# Patient Record
Sex: FEMALE | Race: WHITE | NOT HISPANIC OR LATINO | Employment: UNEMPLOYED | URBAN - METROPOLITAN AREA
[De-identification: names, ages, dates, MRNs, and addresses within clinical notes are randomized per-mention and may not be internally consistent; named-entity substitution may affect disease eponyms.]

---

## 2021-03-03 ENCOUNTER — HOSPITAL ENCOUNTER (EMERGENCY)
Facility: CLINIC | Age: 41
Discharge: HOME | End: 2021-03-03
Attending: EMERGENCY MEDICINE | Admitting: EMERGENCY MEDICINE
Payer: COMMERCIAL

## 2021-03-03 VITALS
DIASTOLIC BLOOD PRESSURE: 98 MMHG | OXYGEN SATURATION: 97 % | HEIGHT: 62 IN | WEIGHT: 150 LBS | SYSTOLIC BLOOD PRESSURE: 157 MMHG | TEMPERATURE: 98.2 F | HEART RATE: 99 BPM | RESPIRATION RATE: 18 BRPM | BODY MASS INDEX: 27.6 KG/M2

## 2021-03-03 DIAGNOSIS — J02.9 PHARYNGITIS, UNSPECIFIED ETIOLOGY: ICD-10-CM

## 2021-03-03 LAB
DEPRECATED S PYO AG THROAT QL EIA: NEGATIVE
FLUAV RNA RESP QL NAA+PROBE: NEGATIVE
FLUBV RNA RESP QL NAA+PROBE: NEGATIVE
LABORATORY COMMENT REPORT: NORMAL
RSV RNA SPEC QL NAA+PROBE: NORMAL
SARS-COV-2 RNA RESP QL NAA+PROBE: NEGATIVE
SPECIMEN SOURCE: NORMAL
STREP GROUP A PCR: NOT DETECTED

## 2021-03-03 PROCEDURE — 250N000011 HC RX IP 250 OP 636: Performed by: EMERGENCY MEDICINE

## 2021-03-03 PROCEDURE — 87651 STREP A DNA AMP PROBE: CPT | Performed by: EMERGENCY MEDICINE

## 2021-03-03 PROCEDURE — 999N001174 HC STATISTIC STREP A RAPID: Performed by: EMERGENCY MEDICINE

## 2021-03-03 PROCEDURE — C9803 HOPD COVID-19 SPEC COLLECT: HCPCS

## 2021-03-03 PROCEDURE — 87636 SARSCOV2 & INF A&B AMP PRB: CPT | Performed by: EMERGENCY MEDICINE

## 2021-03-03 PROCEDURE — 99283 EMERGENCY DEPT VISIT LOW MDM: CPT

## 2021-03-03 RX ORDER — HYDROCODONE BITARTRATE AND ACETAMINOPHEN 7.5; 325 MG/15ML; MG/15ML
15 SOLUTION ORAL 3 TIMES DAILY PRN
Qty: 90 ML | Refills: 0 | Status: SHIPPED | OUTPATIENT
Start: 2021-03-03 | End: 2021-03-05

## 2021-03-03 RX ORDER — DEXAMETHASONE SODIUM PHOSPHATE 10 MG/ML
10 INJECTION, SOLUTION INTRAMUSCULAR; INTRAVENOUS ONCE
Status: COMPLETED | OUTPATIENT
Start: 2021-03-03 | End: 2021-03-03

## 2021-03-03 RX ADMIN — DEXAMETHASONE SODIUM PHOSPHATE 10 MG: 10 INJECTION, SOLUTION INTRAMUSCULAR; INTRAVENOUS at 08:43

## 2021-03-03 ASSESSMENT — ENCOUNTER SYMPTOMS
RHINORRHEA: 0
SHORTNESS OF BREATH: 0
TROUBLE SWALLOWING: 0
FEVER: 0
SORE THROAT: 1
COUGH: 0

## 2021-03-03 ASSESSMENT — MIFFLIN-ST. JEOR: SCORE: 1308.65

## 2021-03-03 NOTE — RESULT ENCOUNTER NOTE
Group A Streptococcus PCR is NEGATIVE  No treatment or change in treatment [facility] ED lab result protocol - Strep protocol.

## 2021-03-03 NOTE — ED PROVIDER NOTES
"  History   Chief Complaint:  Pharyngitis     HPI   Suzette Tadeo is a 40 year old female with a history of tonsillectomy and adenoidectomy  who presents for evaluation of sore throat and erythema that started yesterday. She is able to swallow but feels like there is something stuck. She denies shortness of breath, fever, runny nose, congestion or cough. 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      Review of Systems   Constitutional: Negative for fever.   HENT: Positive for sore throat. Negative for congestion, rhinorrhea and trouble swallowing.    Respiratory: Negative for cough and shortness of breath.    All other systems reviewed and are negative.    Allergies:  The patient has no known allergies.     Medications:  Wellbutrin  Adderall   Salsalate    Past Medical History:    The patient denies past medical history.      Past Surgical History:    Tonsillectomy and adenoidectomy    Mammoplasty, bilateral reduction     Social History:  The patient arrived to the ED Alone via private car.  PCP: Chuck Toth  Tobacco Use: Current Every Day Smoker     Alcohol Use: Yes  Drug Use: No    Physical Exam     Patient Vitals for the past 24 hrs:   BP Temp Temp src Pulse Resp SpO2 Height Weight   03/03/21 1023 -- -- -- -- -- 97 % -- --   03/03/21 0850 -- -- -- 99 -- 97 % -- --   03/03/21 0848 -- -- -- -- -- 98 % -- --   03/03/21 0844 -- -- -- -- 18 -- -- --   03/03/21 0732 (!) 157/98 98.2  F (36.8  C) Oral 111 -- 100 % 1.575 m (5' 2\") 68 kg (150 lb)       Physical Exam   Nursing note and vitals reviewed.  General: Oriented to person, place, and time. Appears well-developed and well-nourished.   Head: No signs of trauma.   Mouth/Throat: Oropharynx is moist. Erythematous throat, previous tonsillectomy and adenoidectomy.   Eyes: Conjunctivae are normal. Pupils are equal, round, and reactive to light.   Neck: Normal range of motion. No nuchal rigidity.   Cardiovascular: " Normal rate and regular rhythm.    Respiratory: Effort normal and breath sounds normal. No respiratory distress.   Abdominal: Soft. There is no tenderness. There is no guarding.   Musculoskeletal: Normal range of motion. no edema.   Neurological: The patient is alert and oriented to person, place, and time.  PERRLA, EOMI, visual fields intact, strength in upper/lower extremities normal and symmetrical.   Sensation normal. Speech normal  GCS eye subscore is 4. GCS verbal subscore is 5. GCS motor subscore is 6.   Skin: Skin is warm and dry. No rash noted.   Psychiatric: normal mood and affect. behavior is normal.     Emergency Department Course     Laboratory:  Symptomatic Influenza A/B antigen & COVID-19 PCR: negative     Rapid Strep Test: negative    Strep Culture: Pending    Emergency Department Course:    Reviewed:  I reviewed the patient's nursing notes, vitals and past medical history.     Assessments:  0825 I performed an exam of the patient in room ED03 as documented above.    Interventions:  0843 Decadron, 10 mg, oral     Disposition:  The patient was discharged to home.     Impression & Plan     CMS Diagnoses: None    Medical Decision Making:  Suzette Tadeo is a 40 year old female who presents for evaluation of a sore throat and clinical evidence of pharyngitis.  The rapid strep test is negative, and formal culture has been set up in the lab. There is no clinical evidence of peritonsillar abscess, retropharyngeal abscess, Lemierre's Syndrome, epiglottis, or Alan's angina. The etiology is most likely viral.  I have recommended treatment with analgesics, and we will await formal culture results.  If the culture is positive, an ED physician will call the patient to initiate anti-microbial therapy. Return if increasing pain, change in voice, neck pain, vomiting, fever, or shortness of breath. Follow-up with primary physician if not improving in 3-5 days. Given well appearance, I would not test further for  other etiologies of serious bacterial infections.     Covid-19  Suzette Tadeo was evaluated during a global COVID-19 pandemic, which necessitated consideration that the patient might be at risk for infection with the SARS-CoV-2 virus that causes COVID-19.   Applicable protocols for evaluation were followed during the patient's care.   COVID-19 was considered as part of the patient's evaluation. The plan for testing is:  a NEGATIVE test was obtained during this visit.    Diagnosis:    ICD-10-CM    1. Pharyngitis, unspecified etiology  J02.9        Discharge Medication List as of 3/3/2021 10:22 AM      START taking these medications    Details   HYDROcodone-acetaminophen (LORTAB)  MG/15ML solution Take 5.6 mLs by mouth 4 times daily for 2 days, Disp-50 mL, R-0, Local Print      HYDROcodone-acetaminophen 7.5-325 MG/15ML solution Take 15 mLs by mouth 3 times daily as needed for severe pain, Disp-90 mL, R-0, Local Print             Scribe Disclosure:  I, Xiomy Coronado, am serving as a scribe at 7:52 AM on 3/3/2021 to document services personally performed by Villa Mays MD based on my observations and the provider's statements to me.     This note was completed in part using Dragon voice recognition software. Although reviewed after completion, some word and grammatical errors may occur.      Villa Mays MD  03/03/21 6430

## 2021-03-03 NOTE — ED TRIAGE NOTES
started yesterday, soreness, able to swallow, denies any dypsnea, fever, cough, shortness of breath

## 2021-05-16 ENCOUNTER — HOSPITAL ENCOUNTER (EMERGENCY)
Facility: CLINIC | Age: 41
Discharge: HOME | End: 2021-05-16
Attending: EMERGENCY MEDICINE | Admitting: EMERGENCY MEDICINE

## 2021-05-16 VITALS
SYSTOLIC BLOOD PRESSURE: 150 MMHG | TEMPERATURE: 97.5 F | RESPIRATION RATE: 16 BRPM | OXYGEN SATURATION: 99 % | HEIGHT: 62 IN | BODY MASS INDEX: 27.6 KG/M2 | HEART RATE: 110 BPM | WEIGHT: 150 LBS | DIASTOLIC BLOOD PRESSURE: 96 MMHG

## 2021-05-16 DIAGNOSIS — S01.81XA FACIAL LACERATION, INITIAL ENCOUNTER: ICD-10-CM

## 2021-05-16 PROCEDURE — 250N000009 HC RX 250: Performed by: EMERGENCY MEDICINE

## 2021-05-16 PROCEDURE — 271N000002 HC RX 271: Performed by: EMERGENCY MEDICINE

## 2021-05-16 PROCEDURE — 12011 RPR F/E/E/N/L/M 2.5 CM/<: CPT

## 2021-05-16 PROCEDURE — 99283 EMERGENCY DEPT VISIT LOW MDM: CPT

## 2021-05-16 RX ORDER — METHYLCELLULOSE 4000CPS 30 %
POWDER (GRAM) MISCELLANEOUS ONCE
Status: COMPLETED | OUTPATIENT
Start: 2021-05-16 | End: 2021-05-16

## 2021-05-16 RX ADMIN — Medication: at 18:20

## 2021-05-16 ASSESSMENT — ENCOUNTER SYMPTOMS
HEADACHES: 0
WOUND: 1
SEIZURES: 0

## 2021-05-16 ASSESSMENT — MIFFLIN-ST. JEOR: SCORE: 1308.65

## 2021-05-16 NOTE — ED PROVIDER NOTES
"  History   Chief Complaint:  Head Laceration    HPI   Suzette Tadeo is a 40 year old female who is not anticoagulated who presents with a head laceration. The patient states she was breaking down a wood chair and the wood hit her on her left upper eyebrow. She denies loss of consciousness, seizure, or any other injury. She states that her tetanus is up to date.     Review of Systems   Skin: Positive for wound.   Neurological: Negative for seizures and headaches.   All other systems reviewed and are negative.    Allergies:  The patient has no known allergies.     Medications:  Senokot-S    Past Medical History:    ADHD  Anxiety  Asthma  Chemical dependency     Past Surgical History:    Tonsillectomy  Mammoplasty reduction, bilateral     Family History:    Mother - Heart Valve Replacement, Hypertension    Social History:  The patient presents to the emergency department with a friend.    Physical Exam     Patient Vitals for the past 24 hrs:   BP Temp Temp src Pulse Resp SpO2 Height Weight   05/16/21 1749 (!) 150/96 97.5  F (36.4  C) Temporal 110 16 99 % 1.575 m (5' 2\") 68 kg (150 lb)       Physical Exam  Physical Exam   General:  Sitting on bed with friend at bedside, comfortable appearing.   HENT:  No obvious trauma to head  Right Ear:  External ear normal.   Left Ear:  External ear normal.   Nose:  Nose normal.   Eyes:  Conjunctivae and EOM are normal.  Neck: No midline cervical neck tenderness, deformity, step off or pain in the midline with ROM, other than 1.5 cm laceration above the left eyebrow with no step off.  Pulm/Chest: No respiratory distress  M/S: Normal range of motion.   Neuro: Alert. GCS 15.  Skin: Skin is warm and dry. No rash noted. Not diaphoretic.   Psych: Normal mood and affect. Behavior is normal.     Emergency Department Course     Procedures    Laceration Repair        LACERATION:  A simple minimally Contaminated 1.5 cm laceration.      LOCATION:  Left eyebrow      FUNCTION:  Distally " sensation and circulation are intact.      ANESTHESIA:  LET - Topical      PREPARATION:  Irrigation and Scrubbing with Techni-Care and Normal Saline      DEBRIDEMENT:  no debridement      CLOSURE:  Wound was closed with One Layer.  Skin closed with 3 x 6.0 Prolene using interrupted sutures.    Emergency Department Course:    Reviewed:  I reviewed vitals, past medical history and care everywhere    Assessments:  1816 I obtained history and examined the patient as noted above.   1915 I performed a laceration repair, procedure note above.   I rechecked the patient and explained findings.    Interventions:  1820 LET topical  1820 Methylcellulose powder topical    Disposition:  The patient was discharged to home.     Impression & Plan   Medical Decision Making:  Suzette Tadeo is a very pleasant 40 year old year old patient who presents to the emergency department for evaluation of a laceration to the forehead. By the Evangeline head CT rules the patient does not warrant head CT evaluation and I believe she is at very low risk for skull fracture or intracerebral bleeding. Concussion is likewise of very low probability with no loss of consciousness and normal mental status here.  Reviewed the risk and benefit of management with her and she agreed against.  Cervical spine is cleared clinically.  The patient had no injury elsewhere and further trauma workup is not necessary. The wound was carefully evaluated and explored. The laceration was closed with sutures as noted above. There is no evidence of muscular, tendon, or bony damage with this laceration. No signs of foreign body. Possible complications (infection, scarring) were reviewed with the patient.  The patient formed us that her tetanus is up-to-date within the past 10 years.  Follow up with primary care will be indicated as noted in the discharge section.    The treatment plan was discussed with the patient and they expressed understanding of this plan and consented  to the plan.  In addition, the patient will return to the emergency department if their symptoms persist, worsen, if new symptoms arise or if there is any concern as other pathology may be present that is not evident at this time. They also understand the importance of close follow up in the clinic and if unable to do so will return to the emergency department for a reevaluation. All questions were answered.    Diagnosis:    ICD-10-CM    1. Facial laceration, initial encounter  S01.81XA      Scribe Disclosure:  I, Karma Nelson, am serving as a scribe at 6:14 PM on 5/16/2021 to document services personally performed by Martin Bailey DO based on my observations and the provider's statements to me.     Martin Bailey DO  05/16/21 1936

## 2023-01-06 ENCOUNTER — HOSPITAL ENCOUNTER (EMERGENCY)
Facility: CLINIC | Age: 43
Discharge: LEFT BEFORE DISCHARGE | End: 2023-01-06
Admitting: EMERGENCY MEDICINE

## 2023-01-06 VITALS
HEART RATE: 99 BPM | TEMPERATURE: 97.7 F | OXYGEN SATURATION: 98 % | DIASTOLIC BLOOD PRESSURE: 106 MMHG | RESPIRATION RATE: 16 BRPM | SYSTOLIC BLOOD PRESSURE: 173 MMHG

## 2023-01-06 LAB
ANION GAP SERPL CALCULATED.3IONS-SCNC: 9 MMOL/L (ref 7–15)
BASOPHILS # BLD AUTO: 0.1 10E3/UL (ref 0–0.2)
BASOPHILS NFR BLD AUTO: 1 %
BUN SERPL-MCNC: 10.2 MG/DL (ref 6–20)
CALCIUM SERPL-MCNC: 9 MG/DL (ref 8.6–10)
CHLORIDE SERPL-SCNC: 103 MMOL/L (ref 98–107)
CREAT SERPL-MCNC: 0.61 MG/DL (ref 0.51–0.95)
DEPRECATED HCO3 PLAS-SCNC: 27 MMOL/L (ref 22–29)
EOSINOPHIL # BLD AUTO: 0.1 10E3/UL (ref 0–0.7)
EOSINOPHIL NFR BLD AUTO: 1 %
ERYTHROCYTE [DISTWIDTH] IN BLOOD BY AUTOMATED COUNT: 12.1 % (ref 10–15)
GFR SERPL CREATININE-BSD FRML MDRD: >90 ML/MIN/1.73M2
GLUCOSE SERPL-MCNC: 110 MG/DL (ref 70–99)
HCG SERPL QL: NEGATIVE
HCT VFR BLD AUTO: 44.1 % (ref 35–47)
HGB BLD-MCNC: 14.3 G/DL (ref 11.7–15.7)
IMM GRANULOCYTES # BLD: 0.1 10E3/UL
IMM GRANULOCYTES NFR BLD: 1 %
LACTATE SERPL-SCNC: 1.4 MMOL/L (ref 0.7–2)
LYMPHOCYTES # BLD AUTO: 2.1 10E3/UL (ref 0.8–5.3)
LYMPHOCYTES NFR BLD AUTO: 19 %
MCH RBC QN AUTO: 30.7 PG (ref 26.5–33)
MCHC RBC AUTO-ENTMCNC: 32.4 G/DL (ref 31.5–36.5)
MCV RBC AUTO: 95 FL (ref 78–100)
MONOCYTES # BLD AUTO: 0.7 10E3/UL (ref 0–1.3)
MONOCYTES NFR BLD AUTO: 6 %
NEUTROPHILS # BLD AUTO: 8.2 10E3/UL (ref 1.6–8.3)
NEUTROPHILS NFR BLD AUTO: 72 %
NRBC # BLD AUTO: 0 10E3/UL
NRBC BLD AUTO-RTO: 0 /100
PLATELET # BLD AUTO: 353 10E3/UL (ref 150–450)
POTASSIUM SERPL-SCNC: 3.7 MMOL/L (ref 3.4–5.3)
RBC # BLD AUTO: 4.66 10E6/UL (ref 3.8–5.2)
SODIUM SERPL-SCNC: 139 MMOL/L (ref 136–145)
WBC # BLD AUTO: 11.2 10E3/UL (ref 4–11)

## 2023-01-06 PROCEDURE — 83605 ASSAY OF LACTIC ACID: CPT | Performed by: EMERGENCY MEDICINE

## 2023-01-06 PROCEDURE — 85025 COMPLETE CBC W/AUTO DIFF WBC: CPT | Performed by: EMERGENCY MEDICINE

## 2023-01-06 PROCEDURE — 36415 COLL VENOUS BLD VENIPUNCTURE: CPT | Performed by: EMERGENCY MEDICINE

## 2023-01-06 PROCEDURE — 250N000011 HC RX IP 250 OP 636: Performed by: EMERGENCY MEDICINE

## 2023-01-06 PROCEDURE — 96365 THER/PROPH/DIAG IV INF INIT: CPT

## 2023-01-06 PROCEDURE — 84703 CHORIONIC GONADOTROPIN ASSAY: CPT | Performed by: EMERGENCY MEDICINE

## 2023-01-06 PROCEDURE — 80048 BASIC METABOLIC PNL TOTAL CA: CPT | Performed by: EMERGENCY MEDICINE

## 2023-01-06 PROCEDURE — 99281 EMR DPT VST MAYX REQ PHY/QHP: CPT | Mod: 25

## 2023-01-06 PROCEDURE — 87040 BLOOD CULTURE FOR BACTERIA: CPT | Performed by: EMERGENCY MEDICINE

## 2023-01-06 RX ORDER — CEFAZOLIN SODIUM 2 G/100ML
2 INJECTION, SOLUTION INTRAVENOUS ONCE
Status: COMPLETED | OUTPATIENT
Start: 2023-01-06 | End: 2023-01-06

## 2023-01-06 RX ADMIN — CEFAZOLIN SODIUM 2 G: 2 INJECTION, SOLUTION INTRAVENOUS at 18:26

## 2023-01-06 ASSESSMENT — ACTIVITIES OF DAILY LIVING (ADL)
ADLS_ACUITY_SCORE: 37
ADLS_ACUITY_SCORE: 37

## 2023-01-06 NOTE — ED TRIAGE NOTES
Pt was seen at [facility] 1/2/23 for abscess.  Pt was transferred to [place] 1/3 for surgery and further eval.  Pt arrived at ED an left ama due to .  She is currently not on antibiotics.  She returned due to pain and swelling.     Triage Assessment     Row Name 01/06/23 1459       Triage Assessment (Adult)    Airway WDL WDL       Respiratory WDL    Respiratory WDL WDL

## 2023-01-06 NOTE — ED NOTES
PIT/Triage Evaluation    Patient presented with continued swelling, pain, redness and discharge over right upper thigh region where she had I&D for abscess performed earlier this week at [facility], transferred to [place] for ongoing IV antibiotics due to extensive wound area but left AMA after 24 hours of therapy due to . She was not discharged on oral antibiotics. She denies fever or chills. She has been able to walk on the affected extremity.     Exam is notable for adult female sitting upright. There is tender, firm soft tissue swelling and erythema over the right lateral upper thigh with central wound with packing, draining small amount of purulence. No crepitus.     Appropriate interventions for symptom management were initiated if applicable.  Appropriate diagnostic tests were initiated if indicated.    I briefly evaluated the patient and developed an initial plan of care. I discussed this plan and explained that this brief interaction does not constitute a full evaluation. Patient/family understands that they should wait to be fully evaluated and discuss any test results with another clinician prior to leaving the hospital.    Due to hospital and departmental capacity constraints and prolonged wait times, this patient was evaluated in non-traditional circumstances such as in triage/waiting room, a hallway, etc. I explained the option to wait for a traditional treatment space and apologized for the inconvenience. Given the circumstances, every attempt was made to provide for the patient's comfort and privacy and to perform the most thorough evaluation possible.       Patricia Wheat MD  01/06/23 4595

## 2023-01-11 LAB
BACTERIA BLD CULT: NO GROWTH
BACTERIA BLD CULT: NO GROWTH

## 2023-03-12 ENCOUNTER — APPOINTMENT (OUTPATIENT)
Dept: CT IMAGING | Facility: CLINIC | Age: 43
End: 2023-03-12
Attending: EMERGENCY MEDICINE

## 2023-03-12 ENCOUNTER — HOSPITAL ENCOUNTER (EMERGENCY)
Facility: CLINIC | Age: 43
Discharge: ANOTHER HOSPITAL | End: 2023-03-13
Attending: EMERGENCY MEDICINE | Admitting: EMERGENCY MEDICINE

## 2023-03-12 DIAGNOSIS — A41.9 SEPSIS, DUE TO UNSPECIFIED ORGANISM, UNSPECIFIED WHETHER ACUTE ORGAN DYSFUNCTION PRESENT (H): ICD-10-CM

## 2023-03-12 DIAGNOSIS — L03.211 FACIAL CELLULITIS: ICD-10-CM

## 2023-03-12 DIAGNOSIS — K02.9 DENTAL DECAY: ICD-10-CM

## 2023-03-12 DIAGNOSIS — F15.10 METHAMPHETAMINE ABUSE (H): ICD-10-CM

## 2023-03-12 DIAGNOSIS — L02.01 FACIAL ABSCESS: ICD-10-CM

## 2023-03-12 LAB
ANION GAP SERPL CALCULATED.3IONS-SCNC: 7 MMOL/L (ref 7–15)
BASOPHILS # BLD AUTO: 0.1 10E3/UL (ref 0–0.2)
BASOPHILS NFR BLD AUTO: 0 %
BUN SERPL-MCNC: 5.6 MG/DL (ref 6–20)
CALCIUM SERPL-MCNC: 8.7 MG/DL (ref 8.6–10)
CHLORIDE SERPL-SCNC: 105 MMOL/L (ref 98–107)
CREAT SERPL-MCNC: 0.55 MG/DL (ref 0.51–0.95)
DEPRECATED HCO3 PLAS-SCNC: 24 MMOL/L (ref 22–29)
EOSINOPHIL # BLD AUTO: 0.1 10E3/UL (ref 0–0.7)
EOSINOPHIL NFR BLD AUTO: 1 %
ERYTHROCYTE [DISTWIDTH] IN BLOOD BY AUTOMATED COUNT: 12.6 % (ref 10–15)
GFR SERPL CREATININE-BSD FRML MDRD: >90 ML/MIN/1.73M2
GLUCOSE BLDC GLUCOMTR-MCNC: 94 MG/DL (ref 70–99)
GLUCOSE SERPL-MCNC: 95 MG/DL (ref 70–99)
HCG SER QL IA.RAPID: NEGATIVE
HCT VFR BLD AUTO: 42.2 % (ref 35–47)
HGB BLD-MCNC: 13.8 G/DL (ref 11.7–15.7)
HOLD SPECIMEN: NORMAL
HOLD SPECIMEN: NORMAL
IMM GRANULOCYTES # BLD: 0.1 10E3/UL
IMM GRANULOCYTES NFR BLD: 1 %
LYMPHOCYTES # BLD AUTO: 1.5 10E3/UL (ref 0.8–5.3)
LYMPHOCYTES NFR BLD AUTO: 11 %
MCH RBC QN AUTO: 30 PG (ref 26.5–33)
MCHC RBC AUTO-ENTMCNC: 32.7 G/DL (ref 31.5–36.5)
MCV RBC AUTO: 92 FL (ref 78–100)
MONOCYTES # BLD AUTO: 1 10E3/UL (ref 0–1.3)
MONOCYTES NFR BLD AUTO: 8 %
NEUTROPHILS # BLD AUTO: 10.6 10E3/UL (ref 1.6–8.3)
NEUTROPHILS NFR BLD AUTO: 79 %
NRBC # BLD AUTO: 0 10E3/UL
NRBC BLD AUTO-RTO: 0 /100
PLATELET # BLD AUTO: 248 10E3/UL (ref 150–450)
POTASSIUM SERPL-SCNC: 3.7 MMOL/L (ref 3.4–5.3)
RBC # BLD AUTO: 4.6 10E6/UL (ref 3.8–5.2)
SODIUM SERPL-SCNC: 136 MMOL/L (ref 136–145)
WBC # BLD AUTO: 13.3 10E3/UL (ref 4–11)

## 2023-03-12 PROCEDURE — 96361 HYDRATE IV INFUSION ADD-ON: CPT

## 2023-03-12 PROCEDURE — 99285 EMERGENCY DEPT VISIT HI MDM: CPT | Mod: 25

## 2023-03-12 PROCEDURE — 36415 COLL VENOUS BLD VENIPUNCTURE: CPT | Performed by: EMERGENCY MEDICINE

## 2023-03-12 PROCEDURE — 70487 CT MAXILLOFACIAL W/DYE: CPT

## 2023-03-12 PROCEDURE — 96365 THER/PROPH/DIAG IV INF INIT: CPT | Mod: 59

## 2023-03-12 PROCEDURE — 258N000003 HC RX IP 258 OP 636: Performed by: EMERGENCY MEDICINE

## 2023-03-12 PROCEDURE — 250N000011 HC RX IP 250 OP 636: Performed by: EMERGENCY MEDICINE

## 2023-03-12 PROCEDURE — 82962 GLUCOSE BLOOD TEST: CPT

## 2023-03-12 PROCEDURE — 80048 BASIC METABOLIC PNL TOTAL CA: CPT | Performed by: EMERGENCY MEDICINE

## 2023-03-12 PROCEDURE — 250N000009 HC RX 250: Performed by: EMERGENCY MEDICINE

## 2023-03-12 PROCEDURE — 87040 BLOOD CULTURE FOR BACTERIA: CPT | Performed by: EMERGENCY MEDICINE

## 2023-03-12 PROCEDURE — 84703 CHORIONIC GONADOTROPIN ASSAY: CPT

## 2023-03-12 PROCEDURE — 85004 AUTOMATED DIFF WBC COUNT: CPT | Performed by: EMERGENCY MEDICINE

## 2023-03-12 PROCEDURE — 96375 TX/PRO/DX INJ NEW DRUG ADDON: CPT

## 2023-03-12 RX ORDER — IOPAMIDOL 755 MG/ML
500 INJECTION, SOLUTION INTRAVASCULAR ONCE
Status: COMPLETED | OUTPATIENT
Start: 2023-03-12 | End: 2023-03-12

## 2023-03-12 RX ORDER — HYDROMORPHONE HYDROCHLORIDE 1 MG/ML
0.5 INJECTION, SOLUTION INTRAMUSCULAR; INTRAVENOUS; SUBCUTANEOUS
Status: DISCONTINUED | OUTPATIENT
Start: 2023-03-12 | End: 2023-03-13 | Stop reason: HOSPADM

## 2023-03-12 RX ORDER — KETOROLAC TROMETHAMINE 15 MG/ML
15 INJECTION, SOLUTION INTRAMUSCULAR; INTRAVENOUS ONCE
Status: COMPLETED | OUTPATIENT
Start: 2023-03-12 | End: 2023-03-12

## 2023-03-12 RX ORDER — AMPICILLIN AND SULBACTAM 2; 1 G/1; G/1
3 INJECTION, POWDER, FOR SOLUTION INTRAMUSCULAR; INTRAVENOUS ONCE
Status: COMPLETED | OUTPATIENT
Start: 2023-03-12 | End: 2023-03-12

## 2023-03-12 RX ADMIN — HYDROMORPHONE HYDROCHLORIDE 0.5 MG: 1 INJECTION, SOLUTION INTRAMUSCULAR; INTRAVENOUS; SUBCUTANEOUS at 21:47

## 2023-03-12 RX ADMIN — KETOROLAC TROMETHAMINE 15 MG: 15 INJECTION, SOLUTION INTRAMUSCULAR; INTRAVENOUS at 19:56

## 2023-03-12 RX ADMIN — IOPAMIDOL 75 ML: 755 INJECTION, SOLUTION INTRAVENOUS at 18:54

## 2023-03-12 RX ADMIN — SODIUM CHLORIDE 65 ML: 9 INJECTION, SOLUTION INTRAVENOUS at 18:54

## 2023-03-12 RX ADMIN — SODIUM CHLORIDE 1000 ML: 9 INJECTION, SOLUTION INTRAVENOUS at 19:56

## 2023-03-12 RX ADMIN — AMPICILLIN SODIUM AND SULBACTAM SODIUM 3 G: 2; 1 INJECTION, POWDER, FOR SOLUTION INTRAMUSCULAR; INTRAVENOUS at 20:39

## 2023-03-12 ASSESSMENT — ACTIVITIES OF DAILY LIVING (ADL)
ADLS_ACUITY_SCORE: 37

## 2023-03-12 NOTE — ED TRIAGE NOTES
Pt arrives with c/o right sided dental pain and facial swelling that started yesterday. Pt reports ongoing dental issues to that side. Pt denies any throat swelling or difficulty breathing. Pt reports feeling fatigued since yesterday, took ibuprofen at 1545. Pt also reports feeling dizzy for the past 2-3 hours, denies headache. Pt reports she had oatmeal this AM, hasn't eaten since this morning, BG 94 in triage.     Triage Assessment     Row Name 03/12/23 2079       Triage Assessment (Adult)    Airway WDL WDL       Respiratory WDL    Respiratory WDL WDL       Skin Circulation/Temperature WDL    Skin Circulation/Temperature WDL WDL       Cardiac WDL    Cardiac WDL WDL       Peripheral/Neurovascular WDL    Peripheral Neurovascular WDL WDL       Cognitive/Neuro/Behavioral WDL    Cognitive/Neuro/Behavioral WDL WDL               N/A

## 2023-03-12 NOTE — ED PROVIDER NOTES
Rapid Assessment Note    History:   Suzette Tadeo is a 42 year old female who presents with dental pain and facial swelling for the past 2 days.  Notes history of poor dentition.  Swelling is progressed, now encroaching the periorbital structures.  Denies associated fevers.  No recent dental procedure.    Exam:   General:  Alert, interactive  Cardiovascular:  Well perfused  Lungs:  No respiratory distress, no accessory muscle use  Neuro:  Moving all 4 extremities  ENT: Poor dentition throughout, tenderness to percussion of right upper jawline.  Associated facial swelling encroaching periorbital space    Plan of Care:   I evaluated the patient and developed an initial plan of care. I discussed this plan and explained that I, or one of my partners, would be returning to complete the evaluation.         3/12/2023  [employer]    Portions of this medical record were completed by a scribe. UPON MY REVIEW AND AUTHENTICATION BY ELECTRONIC SIGNATURE, this confirms (a) I performed the applicable clinical services, and (b) the record is accurate.        Omer Allison MD  03/12/23 4906

## 2023-03-13 ENCOUNTER — ANESTHESIA EVENT (OUTPATIENT)
Dept: SURGERY | Facility: CLINIC | Age: 43
DRG: 872 | End: 2023-03-13

## 2023-03-13 ENCOUNTER — ANESTHESIA (OUTPATIENT)
Dept: SURGERY | Facility: CLINIC | Age: 43
DRG: 872 | End: 2023-03-13

## 2023-03-13 ENCOUNTER — HOSPITAL ENCOUNTER (INPATIENT)
Facility: CLINIC | Age: 43
LOS: 1 days | Discharge: HOME | DRG: 872 | End: 2023-03-14
Attending: INTERNAL MEDICINE | Admitting: INTERNAL MEDICINE

## 2023-03-13 VITALS
OXYGEN SATURATION: 98 % | RESPIRATION RATE: 18 BRPM | SYSTOLIC BLOOD PRESSURE: 169 MMHG | HEART RATE: 98 BPM | TEMPERATURE: 98 F | DIASTOLIC BLOOD PRESSURE: 113 MMHG

## 2023-03-13 DIAGNOSIS — M27.2 ABSCESS OF MAXILLA: Primary | ICD-10-CM

## 2023-03-13 LAB
ANION GAP SERPL CALCULATED.3IONS-SCNC: 9 MMOL/L (ref 7–15)
BUN SERPL-MCNC: 4 MG/DL (ref 6–20)
CALCIUM SERPL-MCNC: 8.4 MG/DL (ref 8.6–10)
CHLORIDE SERPL-SCNC: 101 MMOL/L (ref 98–107)
CREAT SERPL-MCNC: 0.49 MG/DL (ref 0.51–0.95)
DEPRECATED HCO3 PLAS-SCNC: 23 MMOL/L (ref 22–29)
ERYTHROCYTE [DISTWIDTH] IN BLOOD BY AUTOMATED COUNT: 12.4 % (ref 10–15)
GFR SERPL CREATININE-BSD FRML MDRD: >90 ML/MIN/1.73M2
GLUCOSE SERPL-MCNC: 108 MG/DL (ref 70–99)
HCT VFR BLD AUTO: 38.6 % (ref 35–47)
HGB BLD-MCNC: 12.6 G/DL (ref 11.7–15.7)
MCH RBC QN AUTO: 29.8 PG (ref 26.5–33)
MCHC RBC AUTO-ENTMCNC: 32.6 G/DL (ref 31.5–36.5)
MCV RBC AUTO: 91 FL (ref 78–100)
PLATELET # BLD AUTO: 257 10E3/UL (ref 150–450)
POTASSIUM SERPL-SCNC: 3.4 MMOL/L (ref 3.4–5.3)
POTASSIUM SERPL-SCNC: 3.6 MMOL/L (ref 3.4–5.3)
RBC # BLD AUTO: 4.23 10E6/UL (ref 3.8–5.2)
SODIUM SERPL-SCNC: 133 MMOL/L (ref 136–145)
WBC # BLD AUTO: 12.3 10E3/UL (ref 4–11)

## 2023-03-13 PROCEDURE — 250N000011 HC RX IP 250 OP 636: Performed by: DENTIST

## 2023-03-13 PROCEDURE — 360N000075 HC SURGERY LEVEL 2, PER MIN: Performed by: DENTIST

## 2023-03-13 PROCEDURE — 85027 COMPLETE CBC AUTOMATED: CPT | Performed by: INTERNAL MEDICINE

## 2023-03-13 PROCEDURE — 999N000141 HC STATISTIC PRE-PROCEDURE NURSING ASSESSMENT: Performed by: DENTIST

## 2023-03-13 PROCEDURE — 250N000013 HC RX MED GY IP 250 OP 250 PS 637: Performed by: ANESTHESIOLOGY

## 2023-03-13 PROCEDURE — 99223 1ST HOSP IP/OBS HIGH 75: CPT | Mod: AI | Performed by: INTERNAL MEDICINE

## 2023-03-13 PROCEDURE — 250N000013 HC RX MED GY IP 250 OP 250 PS 637: Performed by: HOSPITALIST

## 2023-03-13 PROCEDURE — 120N000001 HC R&B MED SURG/OB

## 2023-03-13 PROCEDURE — 250N000013 HC RX MED GY IP 250 OP 250 PS 637: Performed by: DENTIST

## 2023-03-13 PROCEDURE — 250N000011 HC RX IP 250 OP 636: Performed by: INTERNAL MEDICINE

## 2023-03-13 PROCEDURE — 272N000001 HC OR GENERAL SUPPLY STERILE: Performed by: DENTIST

## 2023-03-13 PROCEDURE — 80048 BASIC METABOLIC PNL TOTAL CA: CPT | Performed by: INTERNAL MEDICINE

## 2023-03-13 PROCEDURE — 36415 COLL VENOUS BLD VENIPUNCTURE: CPT | Performed by: INTERNAL MEDICINE

## 2023-03-13 PROCEDURE — 370N000017 HC ANESTHESIA TECHNICAL FEE, PER MIN: Performed by: DENTIST

## 2023-03-13 PROCEDURE — 84132 ASSAY OF SERUM POTASSIUM: CPT | Performed by: HOSPITALIST

## 2023-03-13 PROCEDURE — 0CDWXZ1 EXTRACTION OF UPPER TOOTH, MULTIPLE, EXTERNAL APPROACH: ICD-10-PCS | Performed by: DENTIST

## 2023-03-13 PROCEDURE — 250N000025 HC SEVOFLURANE, PER MIN: Performed by: DENTIST

## 2023-03-13 PROCEDURE — 710N000010 HC RECOVERY PHASE 1, LEVEL 2, PER MIN: Performed by: DENTIST

## 2023-03-13 PROCEDURE — 258N000003 HC RX IP 258 OP 636: Performed by: ANESTHESIOLOGY

## 2023-03-13 PROCEDURE — 250N000011 HC RX IP 250 OP 636: Performed by: ANESTHESIOLOGY

## 2023-03-13 PROCEDURE — 258N000003 HC RX IP 258 OP 636: Performed by: INTERNAL MEDICINE

## 2023-03-13 PROCEDURE — 250N000013 HC RX MED GY IP 250 OP 250 PS 637: Performed by: INTERNAL MEDICINE

## 2023-03-13 PROCEDURE — 99207 PR APP CREDIT; MD BILLING SHARED VISIT: CPT | Performed by: HOSPITALIST

## 2023-03-13 PROCEDURE — 250N000009 HC RX 250: Performed by: DENTIST

## 2023-03-13 PROCEDURE — 36415 COLL VENOUS BLD VENIPUNCTURE: CPT | Performed by: HOSPITALIST

## 2023-03-13 PROCEDURE — 250N000009 HC RX 250: Performed by: NURSE ANESTHETIST, CERTIFIED REGISTERED

## 2023-03-13 PROCEDURE — 250N000011 HC RX IP 250 OP 636: Performed by: NURSE ANESTHETIST, CERTIFIED REGISTERED

## 2023-03-13 RX ORDER — SODIUM CHLORIDE, SODIUM LACTATE, POTASSIUM CHLORIDE, CALCIUM CHLORIDE 600; 310; 30; 20 MG/100ML; MG/100ML; MG/100ML; MG/100ML
INJECTION, SOLUTION INTRAVENOUS CONTINUOUS
Status: DISCONTINUED | OUTPATIENT
Start: 2023-03-13 | End: 2023-03-13 | Stop reason: HOSPADM

## 2023-03-13 RX ORDER — ONDANSETRON 2 MG/ML
4 INJECTION INTRAMUSCULAR; INTRAVENOUS EVERY 6 HOURS PRN
Status: DISCONTINUED | OUTPATIENT
Start: 2023-03-13 | End: 2023-03-14 | Stop reason: HOSPADM

## 2023-03-13 RX ORDER — KETOROLAC TROMETHAMINE 30 MG/ML
30 INJECTION, SOLUTION INTRAMUSCULAR; INTRAVENOUS ONCE
Status: COMPLETED | OUTPATIENT
Start: 2023-03-13 | End: 2023-03-13

## 2023-03-13 RX ORDER — ONDANSETRON 2 MG/ML
INJECTION INTRAMUSCULAR; INTRAVENOUS PRN
Status: DISCONTINUED | OUTPATIENT
Start: 2023-03-13 | End: 2023-03-13

## 2023-03-13 RX ORDER — POLYETHYLENE GLYCOL 3350 17 G/17G
17 POWDER, FOR SOLUTION ORAL DAILY PRN
Status: DISCONTINUED | OUTPATIENT
Start: 2023-03-13 | End: 2023-03-14 | Stop reason: HOSPADM

## 2023-03-13 RX ORDER — AMPICILLIN AND SULBACTAM 2; 1 G/1; G/1
3 INJECTION, POWDER, FOR SOLUTION INTRAMUSCULAR; INTRAVENOUS EVERY 6 HOURS
Status: DISCONTINUED | OUTPATIENT
Start: 2023-03-13 | End: 2023-03-14 | Stop reason: HOSPADM

## 2023-03-13 RX ORDER — PROPOFOL 10 MG/ML
INJECTION, EMULSION INTRAVENOUS CONTINUOUS PRN
Status: DISCONTINUED | OUTPATIENT
Start: 2023-03-13 | End: 2023-03-13

## 2023-03-13 RX ORDER — LABETALOL HYDROCHLORIDE 5 MG/ML
5 INJECTION, SOLUTION INTRAVENOUS ONCE
Status: COMPLETED | OUTPATIENT
Start: 2023-03-13 | End: 2023-03-13

## 2023-03-13 RX ORDER — CHLORHEXIDINE GLUCONATE ORAL RINSE 1.2 MG/ML
10 SOLUTION DENTAL ONCE
Status: COMPLETED | OUTPATIENT
Start: 2023-03-13 | End: 2023-03-13

## 2023-03-13 RX ORDER — PROCHLORPERAZINE 25 MG
25 SUPPOSITORY, RECTAL RECTAL EVERY 12 HOURS PRN
Status: DISCONTINUED | OUTPATIENT
Start: 2023-03-13 | End: 2023-03-14 | Stop reason: HOSPADM

## 2023-03-13 RX ORDER — HYDRALAZINE HYDROCHLORIDE 20 MG/ML
10 INJECTION INTRAMUSCULAR; INTRAVENOUS EVERY 4 HOURS PRN
Status: DISCONTINUED | OUTPATIENT
Start: 2023-03-13 | End: 2023-03-14 | Stop reason: HOSPADM

## 2023-03-13 RX ORDER — DEXAMETHASONE SODIUM PHOSPHATE 4 MG/ML
INJECTION, SOLUTION INTRA-ARTICULAR; INTRALESIONAL; INTRAMUSCULAR; INTRAVENOUS; SOFT TISSUE PRN
Status: DISCONTINUED | OUTPATIENT
Start: 2023-03-13 | End: 2023-03-13

## 2023-03-13 RX ORDER — ACETAMINOPHEN 325 MG/1
650 TABLET ORAL EVERY 6 HOURS PRN
Status: DISCONTINUED | OUTPATIENT
Start: 2023-03-13 | End: 2023-03-14 | Stop reason: HOSPADM

## 2023-03-13 RX ORDER — POTASSIUM CHLORIDE 1500 MG/1
40 TABLET, EXTENDED RELEASE ORAL ONCE
Status: COMPLETED | OUTPATIENT
Start: 2023-03-13 | End: 2023-03-13

## 2023-03-13 RX ORDER — LIDOCAINE HCL/EPINEPHRINE/PF 2%-1:200K
VIAL (ML) INJECTION PRN
Status: DISCONTINUED | OUTPATIENT
Start: 2023-03-13 | End: 2023-03-13 | Stop reason: HOSPADM

## 2023-03-13 RX ORDER — PROCHLORPERAZINE MALEATE 10 MG
10 TABLET ORAL EVERY 6 HOURS PRN
Status: DISCONTINUED | OUTPATIENT
Start: 2023-03-13 | End: 2023-03-14 | Stop reason: HOSPADM

## 2023-03-13 RX ORDER — IBUPROFEN 600 MG/1
600 TABLET, FILM COATED ORAL EVERY 6 HOURS PRN
Status: DISCONTINUED | OUTPATIENT
Start: 2023-03-13 | End: 2023-03-14 | Stop reason: HOSPADM

## 2023-03-13 RX ORDER — LIDOCAINE HYDROCHLORIDE 20 MG/ML
INJECTION, SOLUTION INFILTRATION; PERINEURAL PRN
Status: DISCONTINUED | OUTPATIENT
Start: 2023-03-13 | End: 2023-03-13

## 2023-03-13 RX ORDER — FENTANYL CITRATE 50 UG/ML
INJECTION, SOLUTION INTRAMUSCULAR; INTRAVENOUS PRN
Status: DISCONTINUED | OUTPATIENT
Start: 2023-03-13 | End: 2023-03-13

## 2023-03-13 RX ORDER — BISACODYL 10 MG
10 SUPPOSITORY, RECTAL RECTAL DAILY PRN
Status: DISCONTINUED | OUTPATIENT
Start: 2023-03-13 | End: 2023-03-14 | Stop reason: HOSPADM

## 2023-03-13 RX ORDER — LIDOCAINE 40 MG/G
CREAM TOPICAL
Status: DISCONTINUED | OUTPATIENT
Start: 2023-03-13 | End: 2023-03-14 | Stop reason: HOSPADM

## 2023-03-13 RX ORDER — ACETAMINOPHEN 650 MG/1
650 SUPPOSITORY RECTAL EVERY 6 HOURS PRN
Status: DISCONTINUED | OUTPATIENT
Start: 2023-03-13 | End: 2023-03-14 | Stop reason: HOSPADM

## 2023-03-13 RX ORDER — SODIUM CHLORIDE, SODIUM LACTATE, POTASSIUM CHLORIDE, CALCIUM CHLORIDE 600; 310; 30; 20 MG/100ML; MG/100ML; MG/100ML; MG/100ML
INJECTION, SOLUTION INTRAVENOUS CONTINUOUS
Status: DISCONTINUED | OUTPATIENT
Start: 2023-03-13 | End: 2023-03-14 | Stop reason: HOSPADM

## 2023-03-13 RX ORDER — PROPOFOL 10 MG/ML
INJECTION, EMULSION INTRAVENOUS PRN
Status: DISCONTINUED | OUTPATIENT
Start: 2023-03-13 | End: 2023-03-13

## 2023-03-13 RX ORDER — ACETAMINOPHEN 500 MG
1000 TABLET ORAL ONCE
Status: COMPLETED | OUTPATIENT
Start: 2023-03-13 | End: 2023-03-13

## 2023-03-13 RX ORDER — ONDANSETRON 4 MG/1
4 TABLET, ORALLY DISINTEGRATING ORAL EVERY 6 HOURS PRN
Status: DISCONTINUED | OUTPATIENT
Start: 2023-03-13 | End: 2023-03-14 | Stop reason: HOSPADM

## 2023-03-13 RX ADMIN — DEXAMETHASONE SODIUM PHOSPHATE 10 MG: 4 INJECTION, SOLUTION INTRA-ARTICULAR; INTRALESIONAL; INTRAMUSCULAR; INTRAVENOUS; SOFT TISSUE at 17:33

## 2023-03-13 RX ADMIN — POTASSIUM CHLORIDE 40 MEQ: 1500 TABLET, EXTENDED RELEASE ORAL at 11:07

## 2023-03-13 RX ADMIN — PROPOFOL 30 MCG/KG/MIN: 10 INJECTION, EMULSION INTRAVENOUS at 17:32

## 2023-03-13 RX ADMIN — AMPICILLIN SODIUM AND SULBACTAM SODIUM 3 G: 2; 1 INJECTION, POWDER, FOR SOLUTION INTRAMUSCULAR; INTRAVENOUS at 02:54

## 2023-03-13 RX ADMIN — KETOROLAC TROMETHAMINE 30 MG: 30 INJECTION, SOLUTION INTRAMUSCULAR at 18:48

## 2023-03-13 RX ADMIN — AMPICILLIN SODIUM AND SULBACTAM SODIUM 3 G: 2; 1 INJECTION, POWDER, FOR SOLUTION INTRAMUSCULAR; INTRAVENOUS at 15:13

## 2023-03-13 RX ADMIN — SODIUM CHLORIDE, POTASSIUM CHLORIDE, SODIUM LACTATE AND CALCIUM CHLORIDE: 600; 310; 30; 20 INJECTION, SOLUTION INTRAVENOUS at 15:13

## 2023-03-13 RX ADMIN — MIDAZOLAM 1 MG: 1 INJECTION INTRAMUSCULAR; INTRAVENOUS at 17:23

## 2023-03-13 RX ADMIN — AMPICILLIN SODIUM AND SULBACTAM SODIUM 3 G: 2; 1 INJECTION, POWDER, FOR SOLUTION INTRAMUSCULAR; INTRAVENOUS at 22:00

## 2023-03-13 RX ADMIN — ONDANSETRON 4 MG: 2 INJECTION INTRAMUSCULAR; INTRAVENOUS at 17:40

## 2023-03-13 RX ADMIN — PROPOFOL 200 MG: 10 INJECTION, EMULSION INTRAVENOUS at 17:28

## 2023-03-13 RX ADMIN — SODIUM CHLORIDE, POTASSIUM CHLORIDE, SODIUM LACTATE AND CALCIUM CHLORIDE: 600; 310; 30; 20 INJECTION, SOLUTION INTRAVENOUS at 01:54

## 2023-03-13 RX ADMIN — AMPICILLIN SODIUM AND SULBACTAM SODIUM 3 G: 2; 1 INJECTION, POWDER, FOR SOLUTION INTRAMUSCULAR; INTRAVENOUS at 09:14

## 2023-03-13 RX ADMIN — FENTANYL CITRATE 50 MCG: 50 INJECTION, SOLUTION INTRAMUSCULAR; INTRAVENOUS at 18:03

## 2023-03-13 RX ADMIN — LABETALOL HYDROCHLORIDE 5 MG: 5 INJECTION INTRAVENOUS at 19:07

## 2023-03-13 RX ADMIN — SUGAMMADEX 200 MG: 100 INJECTION, SOLUTION INTRAVENOUS at 17:56

## 2023-03-13 RX ADMIN — ROCURONIUM BROMIDE 40 MG: 50 INJECTION, SOLUTION INTRAVENOUS at 17:28

## 2023-03-13 RX ADMIN — FENTANYL CITRATE 50 MCG: 50 INJECTION, SOLUTION INTRAMUSCULAR; INTRAVENOUS at 17:28

## 2023-03-13 RX ADMIN — SODIUM CHLORIDE, POTASSIUM CHLORIDE, SODIUM LACTATE AND CALCIUM CHLORIDE: 600; 310; 30; 20 INJECTION, SOLUTION INTRAVENOUS at 16:35

## 2023-03-13 RX ADMIN — ACETAMINOPHEN 650 MG: 325 TABLET ORAL at 11:07

## 2023-03-13 RX ADMIN — LIDOCAINE HYDROCHLORIDE 60 MG: 20 INJECTION, SOLUTION INFILTRATION; PERINEURAL at 17:28

## 2023-03-13 RX ADMIN — ACETAMINOPHEN 1000 MG: 500 TABLET ORAL at 18:32

## 2023-03-13 RX ADMIN — CHLORHEXIDINE GLUCONATE 10 ML: 1.2 SOLUTION ORAL at 16:36

## 2023-03-13 ASSESSMENT — ACTIVITIES OF DAILY LIVING (ADL)
ADLS_ACUITY_SCORE: 26
ADLS_ACUITY_SCORE: 37
ADLS_ACUITY_SCORE: 26

## 2023-03-13 ASSESSMENT — LIFESTYLE VARIABLES: TOBACCO_USE: 1

## 2023-03-13 NOTE — ED PROVIDER NOTES
"History   Chief Complaint:  R facial pain    HPI   History supplemented by electronic chart review    Suzette Tadeo is a 42 year old female who presents for evaluation of right-sided dental and facial pain that started yesterday.  She states that she has had bad teeth for years, her dentist previously told her she needed to have the mole removed, but this has not been done.  No recent trauma or fevers, no recent dental procedures.  She drinks alcohol several times per week, and smokes methamphetamine frequently, unclear when her last use was.  She had some oatmeal this morning but has not had anything since that time.  She thinks that she has infection in the right side of her face.  No visual symptoms.  No headache.  Arms and legs are fine.  She is not diabetic.    Review of External Notes: I performed electronic chart review, I see that she was seen in January for a leg abscess and cellulitis, transferred to [place] but ultimately left AMA a day later.  Patient tells me that this is because she was \"ignored\" at [employer].    Review of Systems:  All other systems reviewed and negative except as above in HPI.     Allergies:  No Known Allergies     Medications:    ibuprofen (ADVIL,MOTRIN) 600 MG tablet  Mis. Devices (BREAST PUMP) MISC  Prenatal MV-Min-Fe Fum-FA-DHA (PRENATAL 1 PO)  senna-docusate (SENOKOT-S;PERICOLACE) 8.6-50 MG per tablet      Past Medical History:    No past medical history on file.    Patient Active Problem List    Diagnosis Date Noted     Active labor 08/03/2013     Priority: Medium        Past Surgical History:    Past Surgical History:   Procedure Laterality Date     MAMMOPLASTY REDUCTION BILATERAL  2010     [facility] RAD RESEC TONSIL/PILLARS      16years of age        Family History:    family history is not on file.    Social History:  Reports that she was clean from meth for almost 20 years, then relapsed about a year ago.  No IV drugs.    Physical Exam     Patient Vitals for the past 24 hrs:   BP " Temp Temp src Pulse Resp SpO2   03/12/23 2250 (!) 179/110 -- -- 98 -- 100 %   03/12/23 2040 (!) 172/110 -- -- -- -- 99 %   03/12/23 2010 (!) 101/92 -- -- -- -- 99 %   03/12/23 2009 -- -- -- -- -- 100 %   03/12/23 2008 -- -- -- -- -- 100 %   03/12/23 2007 -- -- -- -- -- 100 %   03/12/23 2006 -- -- -- -- -- 100 %   03/12/23 2005 -- -- -- -- -- 99 %   03/12/23 2004 -- -- -- -- -- 100 %   03/12/23 1739 (!) 164/110 98  F (36.7  C) Temporal 102 18 100 %      Physical Exam  General: Woman standing by the gurney in room 37  HENT: mucous membranes moist, no difficulty controlling secretions, significant swelling to the right cheek and infraorbital region with moderate tenderness, minimal trismus, dentition overall in very poor condition with multiple teeth absent, no discrete abscess visible intraorally  CV: rate as above, no murmur audible  Resp: normal effort, speaks in full phrases, no stridor, no cough observed  MSK: no bony tenderness to face, FROM mandible, no mastoid tenderness  Skin: appropriately warm and dry, no facial erythema or vesicles, no neck swelling or crepitus  Neuro: alert, clear speech, oriented, no meningismus, ambulatory with steady gait  Psych: cooperative    Emergency Department Course     Imaging:    CT Facial Bones with Contrast   Final Result   IMPRESSION:    1.  Extensive multifocal dental disease as described above.   2.  Right-sided facial cellulitis with 1.5 cm right gingivobuccal sulcus abscess.          Laboratory:  Labs Ordered and Resulted from Time of ED Arrival to Time of ED Departure   BASIC METABOLIC PANEL - Abnormal       Result Value    Sodium 136      Potassium 3.7      Chloride 105      Carbon Dioxide (CO2) 24      Anion Gap 7      Urea Nitrogen 5.6 (*)     Creatinine 0.55      Calcium 8.7      Glucose 95      GFR Estimate >90     CBC WITH PLATELETS AND DIFFERENTIAL - Abnormal    WBC Count 13.3 (*)     RBC Count 4.60      Hemoglobin 13.8      Hematocrit 42.2      MCV 92      MCH  30.0      MCHC 32.7      RDW 12.6      Platelet Count 248      % Neutrophils 79      % Lymphocytes 11      % Monocytes 8      % Eosinophils 1      % Basophils 0      % Immature Granulocytes 1      NRBCs per 100 WBC 0      Absolute Neutrophils 10.6 (*)     Absolute Lymphocytes 1.5      Absolute Monocytes 1.0      Absolute Eosinophils 0.1      Absolute Basophils 0.1      Absolute Immature Granulocytes 0.1      Absolute NRBCs 0.0     GLUCOSE BY METER - Normal    GLUCOSE BY METER POCT 94     ISTAT HCG QUALITATIVE PREGNANCY POCT - Normal    HCG Qualitative POCT Negative     BLOOD CULTURE   BLOOD CULTURE      Emergency Department Course:  Reviewed:  I reviewed nursing notes, vitals, and past medical history    Assessments/Consultations/Discussion of Management or Tests :  I obtained history and examined the patient as noted above.   ED Course as of 03/12/23 2253   Sun Mar 12, 2023   1951 I spoke with Dr. Valdivia, ENT [facility], she defers care to OMFS elsewhere.   2112 I spoke with Dr. Ayoub, [facility] Hospitalist, who accepts care.   2115 I rechecked patient, updated her on plan for transfer.   2138 I spoke with RN re: plan for transfer, ordered IV Dilaudid.       Independent Interpretation (X-rays, CTs, rhythm strip):  I personally reviewed the CT of her facial structures, consistent with abscess.    Interventions:  Medications   HYDROmorphone (PF) (DILAUDID) injection 0.5 mg (0.5 mg Intravenous $Given 3/12/23 2147)   sodium chloride for CT scan flush use (65 mLs Intravenous $Given 3/12/23 1854)   iopamidol (ISOVUE-370) solution 500 mL (75 mLs Intravenous $Given 3/12/23 1854)   ampicillin-sulbactam (UNASYN) 3 g vial to attach to  mL bag (0 g Intravenous Stopped 3/12/23 2124)   ketorolac (TORADOL) injection 15 mg (15 mg Intravenous $Given 3/12/23 1956)   0.9% sodium chloride BOLUS (0 mLs Intravenous Stopped 3/12/23 2124)      Social Determinants of Health affecting care:   Illicit drug use, limited access to  care    Disposition:  Admit to Dr. Ayoub ([facility] Hospitalist), EMS transfer to [facility] for admission    Impression & Plan    Medical Decision Making:  Her presentation is compatible with facial abscess, likely directly related to her previously diagnosed very poor dentition.  With some directed questioning she ultimately admitted to smoking methamphetamine over the past year after many years of sobriety.  She does not have evidence of clinical intoxication at this time, normal mental status and neurologic exam.  She has evidence of facial cellulitis and given her comorbidities, limited access to care, and findings of sepsis with tachycardia and leukocytosis, empiric broad-spectrum antibiotics were initiated.  I spoke with the [facility] otolaryngologist who did not feel that their service could manage this process here at [facility], so ultimately I arranged for her to be transferred by ambulance to [facility] where she was excepted by the hospitalist after discussion with the on-call [facility] surgeon who is covering [facility] today.  All involved parties agree that she does not require emergent surgery overnight, as there is no evidence of imminent threat to breathing or swallowing, though she will benefit from formal consultation in the morning and likely procedural intervention for this abscess.  She is currently boarding in the emergency department awaiting EMS transfer to [facility].  My colleague on the overnight shift is aware of the patient in the unlikely event of acute deterioration in the interim.    Diagnosis:    ICD-10-CM    1. Facial abscess  L02.01       2. Methamphetamine abuse (H)  F15.10       3. Facial cellulitis  L03.211       4. Dental decay  K02.9       5. Sepsis, due to unspecified organism, unspecified whether acute organ dysfunction present (H)  A41.9          3/12/2023   MD Mukund Anderson Jeffrey Alan, MD  03/12/23 1230

## 2023-03-13 NOTE — PROGRESS NOTES
[facility]    Medicine Progress Note - Hospitalist Service    Date of Admission:  3/13/2023    Assessment & Plan   Suzette Tadeo is a 42 year-old female with history of methamphetamine use disorder, MRSA who presents initially to [facility] with facial/gum swelling and pain, found to have facial cellulitis and gingivobuccal abscess, transferred to [facility] for OMFS evaluation and likely drainage. Admitted on 3/13/2023.     Sepsis secondary to right-sided facial cellulitis with right gingivobuccal sulcus abscess   *Presents with progressive right sided facial swelling and discomfort, gum pain  *CT facial bones with extensive dental disease, right-sided facial cellulitis with 1.5 cm right gingivobuccal sulcus abscess.   *WBC 13.3, , afebrile   - OMFS consulted - planning towards OR later today 3/13 - KEEP NPO  - Continue ampicillin-sulbactam IV  - Trend WBC, monitor fever curve  - Blood cultures sent at [facility], follow-up results  - NPO for anticipated surgery/drainage later today 3/13  - Acetaminophen, ibuprofen PO PRN pain      Methamphetamine dependence  *Reports daily methamphetamine use for the past year, prior to that was sober for 17 years   *Desires to quit  - Monitor for withdrawal symptoms  - Chemical dependency consulted      Hx of MRSA  *Per cultures in [facility] 1/2/2023  - Contact precautions      Elevated blood pressure  *No prior diagnosis of hypertension  *Methamphetamine use/abuse, acute illness likely contributing  - Hydralazine IV PRN         Diet: NPO per Anesthesia Guidelines for Procedure/Surgery Except for: Meds    DVT Prophylaxis: Pneumatic Compression Devices  Fall Catheter: Not present  Lines: None     Cardiac Monitoring: None  Code Status: Full Code      Clinically Significant Risk Factors Present on Admission                       # Obesity: Estimated body mass index is 32.15 kg/m  as calculated from the following:    Height as of this encounter:  "1.575 m (5' 2.01\").    Weight as of this encounter: 79.8 kg (175 lb 13.1 oz).           Disposition Plan      Expected Discharge Date: 03/15/2023        Discharge Comments: Tooth extraction 3/13          Ernesto Salcedo MD  Hospitalist Service  [facility]  Securely message with [employer] (more info)  Text page via [employer] Paging/Directory   ______________________________________________________________________    Interval History   Care assumed today. Admitted over night. Seen and examined. No new major complaints/issues.  Denies sob, new pain, fevers or chills. Awaiting OR later today.      Physical Exam   Vital Signs: Temp: 99.1  F (37.3  C) Temp src: Oral BP: (!) 149/87 Pulse: 109   Resp: 16 SpO2: 96 % O2 Device: None (Room air)    Weight: 175 lbs 13.07 oz    Gen: NAD, pleasant  HEENT: EOMI, MMM, R jaw/cheek swollen, not erythematous, some TTP  Resp: no focal crackles, no wheezes, no increased work of resp  CV: S1S2 heard, reg rhythm, reg rate  Abdo: soft, nontender, nondistended, bowel sounds present  Ext: calves nontender, well perfused  Neuro: aa, conversant, moves ext, CN grossly intact, no facial asymmetry      Medical Decision Making       38 MINUTES SPENT BY ME on the date of service doing chart review, history, exam, documentation & further activities per the note.      Data     I have personally reviewed the following data over the past 24 hrs:    12.3 (H)  \   12.6   / 257     133 (L) 101 4.0 (L) /  108 (H)   3.6 23 0.49 (L) \       Imaging results reviewed over the past 24 hrs:   Recent Results (from the past 24 hour(s))   CT Facial Bones with Contrast    Narrative    EXAM: CT FACIAL BONES WITH CONTRAST  LOCATION: [facility]  DATE/TIME: 3/12/2023 7:07 PM    INDICATION: dental pain, facial swelling  COMPARISON: None.  CONTRAST: 75mL Isovue 370  TECHNIQUE: Routine CT Maxillofacial with IV contrast. Multiplanar reformats. Dose reduction techniques were used. "     FINDINGS:  OSSEOUS STRUCTURES/SOFT TISSUES: There is extensive multifocal dental disease. This is most prominent at the right maxillary first premolar where there is large periapical lucency with buccal cortical dehiscence. Smaller periapical lucencies and buccal   cortical dehiscence noted at the right maxillary lateral incisor, left maxillary lateral incisor, left maxillary first canine, and left maxillary first premolar. There is right buccal space soft tissue swelling. There is peripherally enhancing right   gingivobuccal sulcus abscess measuring 5 mm AP by 15 mm transverse adjacent to the right maxillary lateral incisor cortical dehiscence. More posteriorly, there is prominent heterogeneous soft tissue involving the right gingivobuccal sulcus which is   favored to represent phlegmon. There is overlying subcutaneous inflammatory stranding and skin thickening representing cellulitis. There are borderline enlarged bilateral upper level 1 and level 2 cervical lymph nodes which are most likely reactive. No   facial bone fracture or malalignment.     ORBITAL CONTENTS: No acute abnormality.    SINUSES: No paranasal sinus mucosal disease.    VISUALIZED INTRACRANIAL CONTENTS: No acute abnormality.       Impression    IMPRESSION:   1.  Extensive multifocal dental disease as described above.  2.  Right-sided facial cellulitis with 1.5 cm right gingivobuccal sulcus abscess.

## 2023-03-13 NOTE — OP NOTE
OMS OPERATIVE NOTE:    PRE-OP DIAGNOSIS: Facial cellulitis, dental caries    POST-OP DIAGNOSIS: Same    PROCEDURE(S): Removal of erupted teeth #4, 5, 6, 7; surgical removal of erupted tooth #3    SURGEON: Alberto Elam DDS, MD    ASSISTANTS: None    INDICATION:    Suzette Tadeo is a 42 year old female with terminal dentistion who presents with right-sided facial swelling secondary to odontogenic abscess of tooth #7. Based on clinical exam and review of available imaging and labs, it was determined the patient would benefit from extraction of teeth 3-7 with incision and drainage under general anesthesia in the OR.    DETAILED DESCRIPTION:    The patient's identity and planned procedure were verified in the preoperative holding area and the surgical site was marked. Procedure, risks, benefits, and alternatives (including no treatment) were discussed with the patient who voiced understanding of the information and signed an informed consent form after all questions were answered. The patient requested that we leave tooth #8 when I offered to extraction all upper right teeth. The patient was transported to the operating room and transferred to the operating room table and placed in a supine position. All appropriate padding, restraints, leads, and monitors were applied.     A time-out was performed confirming proper patient, site, procedure, that pre-operative antibiotics were administered, and that all necessary equipment was available. The patient was pre-oxygenated and induced into general anesthesia. A oral BUDDY endotracheal tube was placed and secured in the usual fashion.     The surgical site was draped in the usual manner for this procedure. The throat was suctioned and a throat pack placed.  A biteblock was inserted. 5ml of 2% lidocaine HCl with 1:100,000 epinephrine was injected into the upper right vestibule, incisive nerve and greater palatine nerve.     A 10cc syringe with an 18g needle was used to  attempt needle aspiration of the #7 buccal vestibular swelling with no fluid aspirated. A #15 blade was then used to make a sulcular incision from the mesial of tooth #8 distally to tooth #3 with a distobuccal release. A #9 periosteal elevator was used to reflect a full-thickness mucoperiosteal flap. A small straight elevator was used to mobile teeth #3-7. A #150 forceps was used to remove teeth #4-7 and the crown with palatal root of #3. A 560 bur in surgical handpiece was used with copious normal sterile saline irrigation to create a buccal trough and dislodge the buccal roots which were removed with suction. A small molt was used to curette extraction sites and remove extensive granulation tissue. A ronguer and bone file were used remove sharp bony edges. The extraction sites and flap were irrigated with copious normal sterile saline. The flap was re approximated with 3-0 chromic gut suture.     The bite block and throat pack were removed. The oropharynx was suctioned.     There were no complications. The patient tolerated the procedure the well. The patient was allowed to emerge from anesthesia and extubated without complication. The patient was then transferred to the PACU in good condition.     COMPLICATIONS:    None.    EBL:    Less than 10ml

## 2023-03-13 NOTE — CONSULTS
OMS CONSULTATION NOTE    ASSESSMENT : 42 year old female with terminal dentistion presents with right-sided facial swelling secondary to odontogenic abscess of tooth #7.       PLAN/RECOMMENDATIONS:   1. OR for extraction of teeth #3, 4, 5, 6, and 7, Incision and drainage upper right quadrant, KEEP NPO  2. Continue IV unasyn, transition to PO Augmentin BID 7 days  3. Chlorhexidine BID for one week  4. Remaining extractions to be completed outpatient    Alberto Elam DDS, MD  [facility]  [address], [address] [address]  Clinic/On call [phone]  Clinic Fax [phone]    If questions, please call  and ask to be connected to Oral and Maxillofacial Surgeon on call    CHIEF COMPLAINT: My eye was swollen    HISTORY OF PRESENT ILLNESS:   Suzette Tadeo is a 42 year old female who presents with terminal dentistion presents with right-sided facial swelling secondary to odontogenic abscess of tooth #7. Patient states that she developed eyelid swelling. Came to [facility], started on IV abx. Eyelid swelling improving.       REVIEW OF SYSTEMS:  Please refer to HPI.    PAST MEDICAL HISTORY:  Past Medical History:   Diagnosis Date     Methamphetamine abuse (H)      MRSA infection      Past medical history listed above reviewed.    CURRENT MEDICATIONS:     Current Facility-Administered Medications   Medication     [Auto Hold] acetaminophen (TYLENOL) tablet 650 mg    Or     [Auto Hold] acetaminophen (TYLENOL) Suppository 650 mg     [Auto Hold] ampicillin-sulbactam (UNASYN) 3 g vial to attach to  mL bag     [Auto Hold] bisacodyl (DULCOLAX) suppository 10 mg     [Auto Hold] hydrALAZINE (APRESOLINE) injection 10 mg     [Auto Hold] ibuprofen (ADVIL/MOTRIN) tablet 600 mg     lactated ringers infusion     lactated ringers infusion     [Auto Hold] lidocaine (LMX4) cream     [Auto Hold] lidocaine 1 % 0.1-1 mL     [Auto Hold] melatonin tablet 1 mg     [Auto Hold]  ondansetron (ZOFRAN ODT) ODT tab 4 mg    Or     [Auto Hold] ondansetron (ZOFRAN) injection 4 mg     [Auto Hold] polyethylene glycol (MIRALAX) Packet 17 g     PRE OP antibiotics NOT needed for this surgical procedure.     [Auto Hold] prochlorperazine (COMPAZINE) injection 10 mg    Or     [Auto Hold] prochlorperazine (COMPAZINE) tablet 10 mg    Or     [Auto Hold] prochlorperazine (COMPAZINE) suppository 25 mg     [Auto Hold] sodium chloride (PF) 0.9% PF flush 3 mL     [Auto Hold] sodium chloride (PF) 0.9% PF flush 3 mL     Current medications listed above reviewed.    ALLERGIES/SENSITIVITIES:  No Known Allergies  Listed allergies reviewed.     PAST SURGICAL HISTORY:  Past Surgical History:   Procedure Laterality Date     MAMMOPLASTY REDUCTION BILATERAL  2010     [facility] RAD RESEC TONSIL/PILLARS      16years of age     Past surgical history reviewed. Patient denies any bleeding or severe anesthesia complications.     FAMILY HISTORY:  History reviewed. No pertinent family history.   Past family history reviewed. Patient denies any family history of bleeding disorders or severe anesthesia complications.    SOCIAL HISTORY:  Social History     Socioeconomic History     Marital status:      Spouse name: Not on file     Number of children: Not on file     Years of education: Not on file     Highest education level: Not on file   Occupational History     Not on file   Tobacco Use     Smoking status: Every Day     Packs/day: 1.00     Types: Cigarettes     Smokeless tobacco: Never     Tobacco comments:     Down to a few cigarettes per day.    Substance and Sexual Activity     Alcohol use: Yes     Comment: 2 drinks daily     Drug use: Yes     Types: Methamphetamines     Sexual activity: Yes     Partners: Male   Other Topics Concern     Not on file   Social History Narrative     Not on file     Social Determinants of Health     Financial Resource Strain: Not on file   Food Insecurity: Not on file   Transportation Needs: Not on  "file   Physical Activity: Not on file   Stress: Not on file   Social Connections: Not on file   Intimate Partner Violence: Not on file   Housing Stability: Not on file     Social history reviewed.    PHYSICAL EXAM:  Vital signs:   /84 (BP Location: Right arm)   Pulse 88   Temp 98.5  F (36.9  C) (Oral)   Resp 14   Ht 1.575 m (5' 2.01\")   Wt 79.8 kg (175 lb 13.1 oz)   SpO2 98%   BMI 32.15 kg/m     Gen: lying in bed, alert, oriented x 3, No acute distress  Extra-oral: Soft right malar swelling involving lower right lid. Minimal erythema noted.  Intra-oral: Terminal dentition. #7 fractured. Vestibular edema and tenderness.  No lip or buccal mucosal lesions. No tongue or floor of mouth lesions. Floor of mouth non-elevated. Uvula midline.   CV: Normal peripheral perfusion. Regular rate and rhythm.  Lungs: Non-labored breathing. Satting above 92% on room air.    Neuro: Moving all extremities  Psych: Cooperative, appropriate affect    LABS:  Admission on 03/13/2023   Component Date Value Ref Range Status     Sodium 03/13/2023 133 (L)  136 - 145 mmol/L Final     Potassium 03/13/2023 3.4  3.4 - 5.3 mmol/L Final     Chloride 03/13/2023 101  98 - 107 mmol/L Final     Carbon Dioxide (CO2) 03/13/2023 23  22 - 29 mmol/L Final     Anion Gap 03/13/2023 9  7 - 15 mmol/L Final     Urea Nitrogen 03/13/2023 4.0 (L)  6.0 - 20.0 mg/dL Final     Creatinine 03/13/2023 0.49 (L)  0.51 - 0.95 mg/dL Final     Calcium 03/13/2023 8.4 (L)  8.6 - 10.0 mg/dL Final     Glucose 03/13/2023 108 (H)  70 - 99 mg/dL Final     GFR Estimate 03/13/2023 >90  >60 mL/min/1.73m2 Final    eGFR calculated using 2021 CKD-EPI equation.     WBC Count 03/13/2023 12.3 (H)  4.0 - 11.0 10e3/uL Final     RBC Count 03/13/2023 4.23  3.80 - 5.20 10e6/uL Final     Hemoglobin 03/13/2023 12.6  11.7 - 15.7 g/dL Final     Hematocrit 03/13/2023 38.6  35.0 - 47.0 % Final     MCV 03/13/2023 91  78 - 100 fL Final     MCH 03/13/2023 29.8  26.5 - 33.0 pg Final     MCHC " 03/13/2023 32.6  31.5 - 36.5 g/dL Final     RDW 03/13/2023 12.4  10.0 - 15.0 % Final     Platelet Count 03/13/2023 257  150 - 450 10e3/uL Final     Potassium 03/13/2023 3.6  3.4 - 5.3 mmol/L Final   Admission on 03/12/2023, Discharged on 03/13/2023   Component Date Value Ref Range Status     GLUCOSE BY METER POCT 03/12/2023 94  70 - 99 mg/dL Final     Sodium 03/12/2023 136  136 - 145 mmol/L Final     Potassium 03/12/2023 3.7  3.4 - 5.3 mmol/L Final     Chloride 03/12/2023 105  98 - 107 mmol/L Final     Carbon Dioxide (CO2) 03/12/2023 24  22 - 29 mmol/L Final     Anion Gap 03/12/2023 7  7 - 15 mmol/L Final     Urea Nitrogen 03/12/2023 5.6 (L)  6.0 - 20.0 mg/dL Final     Creatinine 03/12/2023 0.55  0.51 - 0.95 mg/dL Final     Calcium 03/12/2023 8.7  8.6 - 10.0 mg/dL Final     Glucose 03/12/2023 95  70 - 99 mg/dL Final     GFR Estimate 03/12/2023 >90  >60 mL/min/1.73m2 Final    eGFR calculated using 2021 CKD-EPI equation.     WBC Count 03/12/2023 13.3 (H)  4.0 - 11.0 10e3/uL Final     RBC Count 03/12/2023 4.60  3.80 - 5.20 10e6/uL Final     Hemoglobin 03/12/2023 13.8  11.7 - 15.7 g/dL Final     Hematocrit 03/12/2023 42.2  35.0 - 47.0 % Final     MCV 03/12/2023 92  78 - 100 fL Final     MCH 03/12/2023 30.0  26.5 - 33.0 pg Final     MCHC 03/12/2023 32.7  31.5 - 36.5 g/dL Final     RDW 03/12/2023 12.6  10.0 - 15.0 % Final     Platelet Count 03/12/2023 248  150 - 450 10e3/uL Final     % Neutrophils 03/12/2023 79  % Final     % Lymphocytes 03/12/2023 11  % Final     % Monocytes 03/12/2023 8  % Final     % Eosinophils 03/12/2023 1  % Final     % Basophils 03/12/2023 0  % Final     % Immature Granulocytes 03/12/2023 1  % Final     NRBCs per 100 WBC 03/12/2023 0  <1 /100 Final     Absolute Neutrophils 03/12/2023 10.6 (H)  1.6 - 8.3 10e3/uL Final     Absolute Lymphocytes 03/12/2023 1.5  0.8 - 5.3 10e3/uL Final     Absolute Monocytes 03/12/2023 1.0  0.0 - 1.3 10e3/uL Final     Absolute Eosinophils 03/12/2023 0.1  0.0 - 0.7  10e3/uL Final     Absolute Basophils 03/12/2023 0.1  0.0 - 0.2 10e3/uL Final     Absolute Immature Granulocytes 03/12/2023 0.1  <=0.4 10e3/uL Final     Absolute NRBCs 03/12/2023 0.0  10e3/uL Final     Hold Specimen 03/12/2023 JIC   Final     Hold Specimen 03/12/2023 JIC   Final     HCG Qualitative POCT 03/12/2023 Negative  Negative, Indeterminate Final     Culture 03/12/2023 No growth after 12 hours   Preliminary     Culture 03/12/2023 No growth after 12 hours   Preliminary        IMAGING:  The following imaging studies were reviewed. I agree with the radiologist's interpretation unless otherwise noted.   No images are attached to the encounter.   Results for orders placed or performed during the hospital encounter of 03/12/23   CT Facial Bones with Contrast    Narrative    EXAM: CT FACIAL BONES WITH CONTRAST  LOCATION: [facility]  DATE/TIME: 3/12/2023 7:07 PM    INDICATION: dental pain, facial swelling  COMPARISON: None.  CONTRAST: 75mL Isovue 370  TECHNIQUE: Routine CT Maxillofacial with IV contrast. Multiplanar reformats. Dose reduction techniques were used.     FINDINGS:  OSSEOUS STRUCTURES/SOFT TISSUES: There is extensive multifocal dental disease. This is most prominent at the right maxillary first premolar where there is large periapical lucency with buccal cortical dehiscence. Smaller periapical lucencies and buccal   cortical dehiscence noted at the right maxillary lateral incisor, left maxillary lateral incisor, left maxillary first canine, and left maxillary first premolar. There is right buccal space soft tissue swelling. There is peripherally enhancing right   gingivobuccal sulcus abscess measuring 5 mm AP by 15 mm transverse adjacent to the right maxillary lateral incisor cortical dehiscence. More posteriorly, there is prominent heterogeneous soft tissue involving the right gingivobuccal sulcus which is   favored to represent phlegmon. There is overlying subcutaneous inflammatory  stranding and skin thickening representing cellulitis. There are borderline enlarged bilateral upper level 1 and level 2 cervical lymph nodes which are most likely reactive. No   facial bone fracture or malalignment.     ORBITAL CONTENTS: No acute abnormality.    SINUSES: No paranasal sinus mucosal disease.    VISUALIZED INTRACRANIAL CONTENTS: No acute abnormality.       Impression    IMPRESSION:   1.  Extensive multifocal dental disease as described above.  2.  Right-sided facial cellulitis with 1.5 cm right gingivobuccal sulcus abscess.

## 2023-03-13 NOTE — ANESTHESIA PROCEDURE NOTES
Airway       Patient location: [facility] - Operating Room or Procedural Area.       Procedure Start/Stop Times: 3/13/2023 5:31 PM  Staff -        Anesthesiologist:  Karma Steward       CRNA: Valery Gutierrez APRN CRNA       Performed By: CRNAIndications and Patient Condition       Indications for airway management: shaina-procedural and airway protection       Induction type:intravenous       Mask difficulty assessment: 1 - vent by mask    Final Airway Details       Final airway type: endotracheal airway       Successful airway: ETT - single  Endotracheal Airway Details        ETT size (mm): 7.0       Cuffed: yes       Successful intubation technique: video laryngoscopy       VL Blade Size: Glidescope 3       Grade View of Cords: 1       Adjucts: stylet       Position: Center       Measured from: gums/teeth       Secured at (cm): 21       Bite block used: None    Post intubation assessment        Placement verified by: capnometry, equal breath sounds and chest rise        Number of attempts at approach: 1       Number of other approaches attempted: 0       Secured with: pink tape       Ease of procedure: easy       Dentition: Intact and Unchanged    Medication(s) Administered   Medication Administration Time: 3/13/2023 5:31 PM

## 2023-03-13 NOTE — ANESTHESIA CARE TRANSFER NOTE
Patient: Suzette Tadeo    Procedure: Procedure(s):  Exam under anesthesia, extraction(s) dental, combined; extraction of teeth number 3, 4, 5, 6, and 7  Incision and drainage of upper right dental abcess       Diagnosis: Abscess of mouth [K12.2]  Diagnosis Additional Information: No value filed.    Anesthesia Type:   General     Note:    Oropharynx: spontaneously breathing  Level of Consciousness: awake  Oxygen Supplementation: face mask  Level of Supplemental Oxygen (L/min / FiO2): 6  Independent Airway: airway patency satisfactory and stable  Dentition: dentition unchanged  Vital Signs Stable: post-procedure vital signs reviewed and stable  Report to RN Given: handoff report given  Patient transferred to: PACU  Comments: Neuromuscular blockade reversed after TOF 4/4, spontaneous respirations, adequate tidal volumes, followed commands to voice, oropharynx suctioned with soft flexible catheter, extubated with suction, airway patent after extubation.  Oxygen via facemask at 6 liters per minute to PACU. Oxygen tubing connected to wall O2 in PACU, SpO2, NiBP, and EKG monitors and alarms on and functioning, report on patient's clinical status given to PACU RN, RN questions answered.     Handoff Report: Identifed the Patient, Identified the Reponsible Provider, Reviewed the pertinent medical history, Discussed the surgical course, Reviewed Intra-OP anesthesia mangement and issues during anesthesia, Set expectations for post-procedure period and Allowed opportunity for questions and acknowledgement of understanding      Vitals:  Vitals Value Taken Time   BP     Temp     Pulse     Resp     SpO2         Electronically Signed By: IMANI Ross CRNA  March 13, 2023  6:10 PM

## 2023-03-13 NOTE — PLAN OF CARE
Reason for Admission: abscess of maxilla.     Cognitive Concerns/ Orientation : A&Ox4  BEHAVIOR & AGGRESSION TOOL COLOR: Green  Neuro/CMS : intact  VS/O2: stable on room air  CARDIOPULMONARY: Denies chest pain and SOB. L/s clear.  MOBILITY: SBA in room  PAIN MANAGMENT: right facial/jaw pain managed with prn Tylenol and ice packs  DIET: NPO   BOWEL/BLADDER: Continent  ABNL LAB/BG: Sodium 133, Potassium 3.40-replaced and recheck ordered  DRAIN/DEVICES: Right PIV infusing IVF  SKIN: scars. Scattered brusing.   TESTS/PROCEDURES: I&D and tooth extraction this evening at 1700 per Marialuisa  D/C DATE and PLACEMENT: TBD  OTHER IMPORTANT INFO and PLAN: remained NPO for procedure.

## 2023-03-13 NOTE — ANESTHESIA PREPROCEDURE EVALUATION
Anesthesia Pre-Procedure Evaluation    Patient: Suzette Tadeo   MRN: [id number] : 1980        Procedure : Procedure(s):  Exam under anesthesia, extraction(s) dental, combined  Incision and drainage oral, combined          Past Medical History:   Diagnosis Date     Methamphetamine abuse (H)      MRSA infection       Past Surgical History:   Procedure Laterality Date     MAMMOPLASTY REDUCTION BILATERAL       ZZC RAD RESEC TONSIL/PILLARS      16years of age      No Known Allergies   Social History     Tobacco Use     Smoking status: Every Day     Packs/day: 1.00     Types: Cigarettes     Smokeless tobacco: Never     Tobacco comments:     Down to a few cigarettes per day.    Substance Use Topics     Alcohol use: Yes     Comment: 2 drinks daily      Wt Readings from Last 1 Encounters:   23 79.8 kg (175 lb 13.1 oz)        Anesthesia Evaluation            ROS/MED HX  ENT/Pulmonary:     (+) tobacco use,     Neurologic:       Cardiovascular:  - neg cardiovascular ROS     METS/Exercise Tolerance:     Hematologic:       Musculoskeletal:       GI/Hepatic:    (-) GERD and liver disease   Renal/Genitourinary:    (-) renal disease   Endo:       Psychiatric/Substance Use:     (+) Recreational drug usage: Meth.    Infectious Disease: Comment: MSSA sepsis related to tooth infection      Malignancy:       Other:            Physical Exam    Airway        Mallampati: III   TM distance: > 3 FB   Neck ROM: full     Respiratory Devices and Support         Dental       (+) Multiple visibly decayed, broken teeth      Cardiovascular   cardiovascular exam normal          Pulmonary   pulmonary exam normal                OUTSIDE LABS:  CBC:   Lab Results   Component Value Date    WBC 12.3 (H) 2023    WBC 13.3 (H) 2023    HGB 12.6 2023    HGB 13.8 2023    HCT 38.6 2023    HCT 42.2 2023     2023     2023     BMP:   Lab Results   Component Value Date     (L)  03/13/2023     03/12/2023    POTASSIUM 3.6 03/13/2023    POTASSIUM 3.4 03/13/2023    CHLORIDE 101 03/13/2023    CHLORIDE 105 03/12/2023    CO2 23 03/13/2023    CO2 24 03/12/2023    BUN 4.0 (L) 03/13/2023    BUN 5.6 (L) 03/12/2023    CR 0.49 (L) 03/13/2023    CR 0.55 03/12/2023     (H) 03/13/2023    GLC 95 03/12/2023     COAGS: No results found for: PTT, INR, FIBR  POC:   Lab Results   Component Value Date    HCG Negative 09/15/2009    HCGS Negative 01/06/2023     HEPATIC: No results found for: ALBUMIN, PROTTOTAL, ALT, AST, GGT, ALKPHOS, BILITOTAL, BILIDIRECT, PETE  OTHER:   Lab Results   Component Value Date    LACT 1.4 01/06/2023    SAWYER 8.4 (L) 03/13/2023       Anesthesia Plan    ASA Status:  3      Anesthesia Type: General.     - Airway: ETT         Techniques and Equipment:     - Airway: Video-Laryngoscope         Consents    Anesthesia Plan(s) and associated risks, benefits, and realistic alternatives discussed. Questions answered and patient/representative(s) expressed understanding.    - Discussed:     - Discussed with:  Patient         Postoperative Care       PONV prophylaxis: Ondansetron (or other 5HT-3), Background Propofol Infusion, Dexamethasone or Solumedrol     Comments:                Karma Steward

## 2023-03-13 NOTE — PLAN OF CARE
Came from [facility] around midnight. Here for R sided facial cellulitis w/ abscess. Pt is A&O x4, however, in and out of sleep this shift. VSS on RA, exp HTN. Denies pain & N/V. On contact precautions. Pt has a HX. of substance abuse - no withdrawal symptoms noticed this shift. PIV infusing LR @ 100 ml/hr. Pt has old abscess scars underneath L armpit & on R thigh. Up IND, voiding spontaneously, no BM, passing gas per pt. Tolerating NPO. Surgery following.

## 2023-03-13 NOTE — PROVIDER NOTIFICATION
PACU spoke to Dr Marialuisa MD ok with patient receiving Toradol after procedure.  30mg IV toradol ordered and given.

## 2023-03-13 NOTE — PHARMACY-ADMISSION MEDICATION HISTORY
Pharmacy Medication History  Admission medication history interview status for the 3/13/2023  admission is complete. See EPIC admission navigator for prior to admission medications     Location of Interview: Phone  Medication history sources: Patient and Surescripts    Significant changes made to the medication list:  Removed ibuprofen, prenatal vitamin, and senna-S (all meds from 2013)    In the past week, patient estimated taking medication this percent of the time: N/A    Medication Affordability:  Not including over the counter (OTC) medications, was there a time in the past 12 months when you did not take your medications as prescribed because of cost?: Unable to Assess    Additional medication history information:   None    Medication reconciliation completed by provider prior to medication history? No    Time spent in this activity: 5 minutes    Prior to Admission medications    Medication Sig Last Dose Taking? Auth Provider Long Term End Date   Misc. Devices (BREAST PUMP) MISC 1 each as needed  Patient not taking: Reported on 3/13/2023 Not Taking  Bryant Emery MD         The information provided in this note is only as accurate as the sources available at the time of update(s)

## 2023-03-13 NOTE — OR NURSING
Patient discussed with Dr Elam, not wanting tooth #8 removed today.  Per Dr Elam that is ok.  Patient crossed off and initialed #8 on the consent form.

## 2023-03-13 NOTE — H&P
[facility]    History and Physical - Hospitalist Service       Date of Admission:  3/13/2023    Assessment & Plan   Suzette Tadeo is a 42 year-old female with history of methamphetamine use disorder, MRSA who presents initially to [facility] with facial/gum swelling and pain, found to have facial cellulitis and gingivobuccal abscess, transferred to [facility] for OMFS evaluation and likely drainage. Admitted on 3/13/2023.    Sepsis secondary to right-sided facial cellulitis with right gingivobuccal sulcus abscess   *Presents with progressive right sided facial swelling and discomfort, gum pain  *CT facial bones with extensive dental disease, right-sided facial cellulitis with 1.5 cm right gingivobuccal sulcus abscess.   *WBC 13.3, , afebrile   - OMFS consulted; consult order placed, but reportedly OMFS requested call to clinic to alert of consult at 7 AM, clinic number [phone]   - Continue ampicillin-sulbactam IV  - Trend WBC  - Monitor fever curve  - Blood cultures sent at [facility], follow-up results  - NPO for anticipated surgery/drainage  - Acetaminophen, ibuprofen PO PRN pain     Methamphetamine dependence  *Reports daily methamphetamine use for the past year, prior to that was sober for 17 years   *Desires to quit  - Monitor for withdrawal symptoms  - Chemical dependency consulted     Hx of MRSA  *Per cultures in [facility] 1/2/2023  - Contact precautions     Elevated blood pressure  *No prior diagnosis of hypertension  *Methamphetamine use/abuse, acute illness likely contributing  - Hydralazine IV PRN        Diet: NPO per Anesthesia Guidelines for Procedure/Surgery Except for: Meds    DVT Prophylaxis: Pneumatic Compression Devices  Fall Catheter: Not present  Code Status: Full Code    Disposition Plan   Admit to inpatient. Anticipate greater than or equal to 2 midnights prior to discharge.     Entered: Jordon Polk MD 03/13/2023, 3:08 AM     The patient's care  "was discussed with the patient and bedside RN       Clinically Significant Risk Factors Present on Admission                       # Obesity: Estimated body mass index is 32.15 kg/m  as calculated from the following:    Height as of this encounter: 1.575 m (5' 2.01\").    Weight as of this encounter: 79.8 kg (175 lb 13.1 oz).                Jordon Polk MD  [facility]    ______________________________________________________________________    Chief Complaint   Right facial and gum pain/swelling    History of Present Illness   Suzette Tadeo is a 42 year old female who presents with the above chief complaint.    History is obtained from the patient and review of medical record including [facility] ED notes. The patient reports she awoke 3/11/23 with right-sided facial pain and swelling as well as pain in her gums. She initially managed with ibuprofen which helped with her symptoms, however her swelling continued to progress and so she presented to the [facility] emergency department for further evaluation.  There she underwent a facial CT which demonstrated facial cellulitis with gingivobuccal abscess.  Her case was initially discussed with on-call ENT, who deferred cares to oral surgery.  Her case was discussed with OMFS on-call who recommended transfer to [facility] for likely drainage.  The patient denies any difficulty swallowing, difficulty breathing.  She reports she chronically has poor dentition and has previously been recommended to have all of her teeth pulled.  She denies any recent dental work.  She denies any fevers or chills.  She smokes methamphetamine on a daily basis for the past year, prior to that was sober for 17 years.  She desires to get sober and quit methamphetamine completely.    Review of Systems    Complete 10 point review of systems assessed and negative except as noted in HPI.    Past Medical History    I have reviewed this patient's " medical history and updated it with pertinent information if needed.   Past Medical History:   Diagnosis Date     Methamphetamine abuse (H)      MRSA infection        Past Surgical History   I have reviewed this patient's surgical history and updated it with pertinent information if needed.  Past Surgical History:   Procedure Laterality Date     MAMMOPLASTY REDUCTION BILATERAL       [facility] RAD RESEC TONSIL/PILLARS      16years of age         Social History   I have reviewed this patient's social history and updated it with pertinent information if needed.  Social History     Tobacco Use     Smoking status: Every Day     Packs/day: 1.00     Types: Cigarettes     Smokeless tobacco: Never     Tobacco comments:     Down to a few cigarettes per day.    Substance Use Topics     Alcohol use: Yes     Comment: 2 drinks daily     Drug use: Yes     Types: Methamphetamines       Family History   Reviewed and non-contributory to chief complaint     Prior to Admission Medications  - Pending pharmacy reconciliation   Prior to Admission Medications   Prescriptions Last Dose Informant Patient Reported? Taking?   Misc. Devices (BREAST PUMP) MISC   No No   Si each as needed   Prenatal MV-Min-Fe Fum-FA-DHA (PRENATAL 1 PO)   Yes No   Sig: Take by mouth daily   ibuprofen (ADVIL,MOTRIN) 600 MG tablet   No No   Sig: Take 1 tablet (600 mg) by mouth every 6 hours as needed for pain   senna-docusate (SENOKOT-S;PERICOLACE) 8.6-50 MG per tablet   No No   Sig: Take 1-2 tablets by mouth 2 times daily      Facility-Administered Medications: None     Allergies   No Known Allergies    Physical Exam   Vital Signs: Temp: 99.3  F (37.4  C) Temp src: Oral BP: (!) 182/109 (MD aware.) Pulse: 98   Resp: 18 SpO2: 100 % O2 Device: None (Room air)    Weight: 175 lbs 13.07 oz    Constitutional: Well-appearing, NAD  Eyes: PERRL, EOMI  HENT: Right facial swelling with tenderness to palpation, no significant overlying erythema, tenderness to palpation along  right upper gums. Poor dentition.   Respiratory: Clear to auscultation bilaterally, good air movement, normal effort   Cardiovascular: RRR, no m/r/g. No peripheral edema.    GI: Soft, non-tender, non-distended. No rebound tenderness or guarding.    Skin: Warm, dry   Neurologic: Alert. Responding to questions appropriately. Following commands.   Psychiatric: Normal affect, appropriate      Data   Data reviewed today: I reviewed all medications, new labs and imaging results over the last 24 hours. I personally reviewed no images or EKG's today.    Recent Labs   Lab 03/12/23  1831 03/12/23  1745   WBC 13.3*  --    HGB 13.8  --    MCV 92  --      --      --    POTASSIUM 3.7  --    CHLORIDE 105  --    CO2 24  --    BUN 5.6*  --    CR 0.55  --    ANIONGAP 7  --    SAWYER 8.7  --    GLC 95 94       Recent Results (from the past 24 hour(s))   CT Facial Bones with Contrast    Narrative    EXAM: CT FACIAL BONES WITH CONTRAST  LOCATION: [facility]  DATE/TIME: 3/12/2023 7:07 PM    INDICATION: dental pain, facial swelling  COMPARISON: None.  CONTRAST: 75mL Isovue 370  TECHNIQUE: Routine CT Maxillofacial with IV contrast. Multiplanar reformats. Dose reduction techniques were used.     FINDINGS:  OSSEOUS STRUCTURES/SOFT TISSUES: There is extensive multifocal dental disease. This is most prominent at the right maxillary first premolar where there is large periapical lucency with buccal cortical dehiscence. Smaller periapical lucencies and buccal   cortical dehiscence noted at the right maxillary lateral incisor, left maxillary lateral incisor, left maxillary first canine, and left maxillary first premolar. There is right buccal space soft tissue swelling. There is peripherally enhancing right   gingivobuccal sulcus abscess measuring 5 mm AP by 15 mm transverse adjacent to the right maxillary lateral incisor cortical dehiscence. More posteriorly, there is prominent heterogeneous soft tissue involving  the right gingivobuccal sulcus which is   favored to represent phlegmon. There is overlying subcutaneous inflammatory stranding and skin thickening representing cellulitis. There are borderline enlarged bilateral upper level 1 and level 2 cervical lymph nodes which are most likely reactive. No   facial bone fracture or malalignment.     ORBITAL CONTENTS: No acute abnormality.    SINUSES: No paranasal sinus mucosal disease.    VISUALIZED INTRACRANIAL CONTENTS: No acute abnormality.       Impression    IMPRESSION:   1.  Extensive multifocal dental disease as described above.  2.  Right-sided facial cellulitis with 1.5 cm right gingivobuccal sulcus abscess.

## 2023-03-13 NOTE — BRIEF OP NOTE
[facility]    Brief Operative Note    Pre-operative diagnosis: Abscess of mouth [K12.2]  Post-operative diagnosis Same as pre-operative diagnosis    Procedure: Extraction of teeth 3, 4, 5, 6, and 7  Surgeon: Surgeon(s) and Role:     * Alberto Elam DDS - Primary  Anesthesia: General   Estimated Blood Loss: Less than 10 ml    Drains: None  Specimens: * No specimens in log *  Findings:   No purulent drainage noted on exploration  Complications: None.  Implants: * No implants in log *

## 2023-03-14 VITALS
HEART RATE: 84 BPM | BODY MASS INDEX: 32.35 KG/M2 | SYSTOLIC BLOOD PRESSURE: 121 MMHG | TEMPERATURE: 98.4 F | DIASTOLIC BLOOD PRESSURE: 82 MMHG | OXYGEN SATURATION: 98 % | WEIGHT: 175.82 LBS | HEIGHT: 62 IN | RESPIRATION RATE: 16 BRPM

## 2023-03-14 LAB
ANION GAP SERPL CALCULATED.3IONS-SCNC: 9 MMOL/L (ref 7–15)
BUN SERPL-MCNC: 8.6 MG/DL (ref 6–20)
CALCIUM SERPL-MCNC: 9.2 MG/DL (ref 8.6–10)
CHLORIDE SERPL-SCNC: 105 MMOL/L (ref 98–107)
CREAT SERPL-MCNC: 0.58 MG/DL (ref 0.51–0.95)
DEPRECATED HCO3 PLAS-SCNC: 24 MMOL/L (ref 22–29)
ERYTHROCYTE [DISTWIDTH] IN BLOOD BY AUTOMATED COUNT: 12.2 % (ref 10–15)
GFR SERPL CREATININE-BSD FRML MDRD: >90 ML/MIN/1.73M2
GLUCOSE SERPL-MCNC: 155 MG/DL (ref 70–99)
HCT VFR BLD AUTO: 38.9 % (ref 35–47)
HGB BLD-MCNC: 12.6 G/DL (ref 11.7–15.7)
MCH RBC QN AUTO: 29.6 PG (ref 26.5–33)
MCHC RBC AUTO-ENTMCNC: 32.4 G/DL (ref 31.5–36.5)
MCV RBC AUTO: 92 FL (ref 78–100)
PLATELET # BLD AUTO: 321 10E3/UL (ref 150–450)
POTASSIUM SERPL-SCNC: 4.2 MMOL/L (ref 3.4–5.3)
RBC # BLD AUTO: 4.25 10E6/UL (ref 3.8–5.2)
SODIUM SERPL-SCNC: 138 MMOL/L (ref 136–145)
WBC # BLD AUTO: 8.8 10E3/UL (ref 4–11)

## 2023-03-14 PROCEDURE — 99239 HOSP IP/OBS DSCHRG MGMT >30: CPT | Performed by: HOSPITALIST

## 2023-03-14 PROCEDURE — 258N000003 HC RX IP 258 OP 636: Performed by: DENTIST

## 2023-03-14 PROCEDURE — 85027 COMPLETE CBC AUTOMATED: CPT | Performed by: DENTIST

## 2023-03-14 PROCEDURE — 250N000011 HC RX IP 250 OP 636: Performed by: DENTIST

## 2023-03-14 PROCEDURE — 999N000216 HC STATISTIC ADULT CD FACE TO FACE-NO CHRG

## 2023-03-14 PROCEDURE — 36415 COLL VENOUS BLD VENIPUNCTURE: CPT | Performed by: DENTIST

## 2023-03-14 PROCEDURE — 82310 ASSAY OF CALCIUM: CPT | Performed by: DENTIST

## 2023-03-14 RX ORDER — CHLORHEXIDINE GLUCONATE ORAL RINSE 1.2 MG/ML
15 SOLUTION DENTAL 2 TIMES DAILY
Qty: 210 ML | Refills: 0 | Status: SHIPPED | OUTPATIENT
Start: 2023-03-14 | End: 2023-03-21

## 2023-03-14 RX ADMIN — AMPICILLIN SODIUM AND SULBACTAM SODIUM 3 G: 2; 1 INJECTION, POWDER, FOR SOLUTION INTRAMUSCULAR; INTRAVENOUS at 16:16

## 2023-03-14 RX ADMIN — AMPICILLIN SODIUM AND SULBACTAM SODIUM 3 G: 2; 1 INJECTION, POWDER, FOR SOLUTION INTRAMUSCULAR; INTRAVENOUS at 08:54

## 2023-03-14 RX ADMIN — SODIUM CHLORIDE, POTASSIUM CHLORIDE, SODIUM LACTATE AND CALCIUM CHLORIDE: 600; 310; 30; 20 INJECTION, SOLUTION INTRAVENOUS at 03:35

## 2023-03-14 RX ADMIN — AMPICILLIN SODIUM AND SULBACTAM SODIUM 3 G: 2; 1 INJECTION, POWDER, FOR SOLUTION INTRAMUSCULAR; INTRAVENOUS at 03:14

## 2023-03-14 ASSESSMENT — ACTIVITIES OF DAILY LIVING (ADL)
ADLS_ACUITY_SCORE: 26

## 2023-03-14 NOTE — DISCHARGE SUMMARY
[facility]  Hospitalist Discharge Summary      Date of Admission:  3/13/2023  Date of Discharge:  3/14/2023  5:16 PM  Discharging Provider: Ernesto Salcedo MD  Discharge Service: Hospitalist Service    Discharge Diagnoses   Sepsis secondary to right-sided facial cellulitis with right gingivobuccal sulcus abscess s/p Extraction of teeth 3, 4, 5, 6, and 7  Methamphetamine dependence  Hx of MRSA  Elevated blood pressure      Follow-ups Needed After Discharge   Follow-up Appointments     Follow-up and recommended labs and tests       - Follow up 3-5 days with OMS, outpatient general dentist follow-up  - Follow up with PCP for ongoing care - strongly consider chemical   dependency assistance/support             Unresulted Labs Ordered in the Past 30 Days of this Admission     Date and Time Order Name Status Description    3/12/2023  7:55 PM Blood Culture Arm, Left Preliminary     3/12/2023  7:47 PM Blood Culture Peripheral Blood Preliminary       These results will be followed up by IM/OMFS    Discharge Disposition   Discharged to home  Condition at discharge: Stable      Hospital Course   Suzette Tadeo is a 42 year-old female with history of methamphetamine use disorder, MRSA who presents initially to [facility] with facial/gum swelling and pain, found to have facial cellulitis and gingivobuccal abscess, transferred to [facility] for OMFS evaluation and likely drainage. Admitted on 3/13/2023.     Sepsis secondary to right-sided facial cellulitis with right gingivobuccal sulcus abscess   - s/p Extraction of teeth 3, 4, 5, 6, and 7  *Presents with progressive right sided facial swelling and discomfort, gum pain  *CT facial bones with extensive dental disease, right-sided facial cellulitis with 1.5 cm right gingivobuccal sulcus abscess.   *WBC 13.3, , afebrile   - OMFS consulted - planning towards OR later today 3/13 - KEEP NPO  - Continue ampicillin-sulbactam IV  - Trend WBC,  monitor fever curve  - Blood cultures sent at [facility], follow-up results  - procedure as above completed 3/13  - 3/14 - afebrile, nontoxic and ready to discharge, per OMFS okay to discharge, augmentin x 7 days, chloraseptic oral, and OMFS 3-5d, and general dentist follow up  - Acetaminophen, ibuprofen PO PRN pain      Methamphetamine dependence  *Reports daily methamphetamine use for the past year, prior to that was sober for 17 years   *Desires to quit  - Monitor for withdrawal symptoms  - Chemical dependency consulted      Hx of MRSA  *Per cultures in [facility] 1/2/2023  - Contact precautions      Elevated blood pressure  *No prior diagnosis of hypertension  *Methamphetamine use/abuse, acute illness likely contributing  - Hydralazine IV PRN     Consultations This Hospital Stay   ORAL SURGERY IP CONSULT  CHEMICAL DEPENDENCY IP CONSULT    Code Status   Full Code    Time Spent on this Encounter   I, Ernesto Salcedo MD, personally saw the patient today and spent greater than 30 minutes discharging this patient.       Ernesto Salcedo MD  [address] [address]  Phone: [phone]  Fax: [phone]  ______________________________________________________________________    Physical Exam   Vital Signs: Temp: 98.4  F (36.9  C) Temp src: Oral BP: 121/82 Pulse: 84   Resp: 16 SpO2: 98 % O2 Device: None (Room air)    Weight: 175 lbs 13.07 oz    Gen: NAD, pleasant  HEENT: EOMI, MMM  Resp: no focal crackles, no wheezes, no increased work of resp  CV: S1S2 heard, reg rhythm, reg rate  Abdo: soft, nontender, nondistended, bowel sounds present  Ext: calves nontender, well perfused  Neuro: aa, conversant, moving all ext, CN grossly intact, no facial asymmetry         Primary Care Physician   Physician No Ref-Primary    Discharge Orders      Reason for your hospital stay    R facial cellulitis requiring teeth extraction     Follow-up and recommended labs and  tests     - Follow up 3-5 days with OMS, outpatient general dentist follow-up  - Follow up with PCP for ongoing care - strongly consider chemical dependency assistance/support     Activity    Your activity upon discharge: activity as tolerated     Discharge Instructions    Continue augmentin and peridex solution. Follow up with OMFS in 3-5 days and general dentistry as well.     Diet    Follow this diet upon discharge: Orders Placed This Encounter      Mechanical/Dental Soft Diet       Significant Results and Procedures   Most Recent 3 CBC's:Recent Labs   Lab Test 03/14/23  0655 03/13/23  0853 03/12/23  1831   WBC 8.8 12.3* 13.3*   HGB 12.6 12.6 13.8   MCV 92 91 92    257 248     Most Recent 3 BMP's:Recent Labs   Lab Test 03/14/23  0655 03/13/23  1535 03/13/23  0853 03/12/23  1831     --  133* 136   POTASSIUM 4.2 3.6 3.4 3.7   CHLORIDE 105  --  101 105   CO2 24  --  23 24   BUN 8.6  --  4.0* 5.6*   CR 0.58  --  0.49* 0.55   ANIONGAP 9  --  9 7   SAWYER 9.2  --  8.4* 8.7   *  --  108* 95     7-Day Micro Results     Collected Updated Procedure Result Status      03/12/2023 2034 03/13/2023 2301 Blood Culture Arm, Left [[id number]]   Blood from Arm, Left    Preliminary result Component Value   Culture No growth after 1 day  [P]                03/12/2023 1956 03/13/2023 2301 Blood Culture Peripheral Blood [[id number]]   Peripheral Blood    Preliminary result Component Value   Culture No growth after 1 day  [P]                  ,   Results for orders placed or performed during the hospital encounter of 03/12/23   CT Facial Bones with Contrast    Narrative    EXAM: CT FACIAL BONES WITH CONTRAST  LOCATION: [facility]  DATE/TIME: 3/12/2023 7:07 PM    INDICATION: dental pain, facial swelling  COMPARISON: None.  CONTRAST: 75mL Isovue 370  TECHNIQUE: Routine CT Maxillofacial with IV contrast. Multiplanar reformats. Dose reduction techniques were used.     FINDINGS:  OSSEOUS  STRUCTURES/SOFT TISSUES: There is extensive multifocal dental disease. This is most prominent at the right maxillary first premolar where there is large periapical lucency with buccal cortical dehiscence. Smaller periapical lucencies and buccal   cortical dehiscence noted at the right maxillary lateral incisor, left maxillary lateral incisor, left maxillary first canine, and left maxillary first premolar. There is right buccal space soft tissue swelling. There is peripherally enhancing right   gingivobuccal sulcus abscess measuring 5 mm AP by 15 mm transverse adjacent to the right maxillary lateral incisor cortical dehiscence. More posteriorly, there is prominent heterogeneous soft tissue involving the right gingivobuccal sulcus which is   favored to represent phlegmon. There is overlying subcutaneous inflammatory stranding and skin thickening representing cellulitis. There are borderline enlarged bilateral upper level 1 and level 2 cervical lymph nodes which are most likely reactive. No   facial bone fracture or malalignment.     ORBITAL CONTENTS: No acute abnormality.    SINUSES: No paranasal sinus mucosal disease.    VISUALIZED INTRACRANIAL CONTENTS: No acute abnormality.       Impression    IMPRESSION:   1.  Extensive multifocal dental disease as described above.  2.  Right-sided facial cellulitis with 1.5 cm right gingivobuccal sulcus abscess.        Discharge Medications   Discharge Medication List as of 3/14/2023  4:22 PM      START taking these medications    Details   amoxicillin-clavulanate (AUGMENTIN) 875-125 MG tablet Take 1 tablet by mouth 2 times daily for 7 days, Disp-14 tablet, R-0, E-Prescribe      chlorhexidine (PERIDEX) 0.12 % solution Swish and spit 15 mLs in mouth 2 times daily for 7 days, Disp-210 mL, R-0, E-Prescribe         STOP taking these medications       Misc. Devices (BREAST PUMP) MISC Comments:   Reason for Stopping:             Allergies   No Known Allergies

## 2023-03-14 NOTE — PROVIDER NOTIFICATION
Dr Steward updated with patients elevated BP's- 160s/100's BP.  HR: 91  Verbal order for 5mg Labetalol to be given.

## 2023-03-14 NOTE — PLAN OF CARE
SHIFT: 1500 - 1930   VITALS: VSS on RA  PAIN: Reports pain in R face/jaw managed with ice packs  IV ACCESS: PIV infusing LR @ 100 mL/hr   COGNITIVE: WDL   RESP: WDL  CARDIAC: WDL  NEUROVASCULAR: WDL  GI/: WDL, continent  SKIN: Old scars - not needing intervention  MOBILITY: SBA, steady gait  DIET: NPO for upcoming procedure  OTHER: Pt off unit at approx 1640 for teeth extraction procedure  D/C: Pending

## 2023-03-14 NOTE — PROGRESS NOTES
Patient discharged from gen surg unit w/ NA via WC.  Orientation: x4  Respirations status: WNL RA  Ambulation status: Ind  Pain status: Denies  VS: WNL  PIV: Removed and dressed  Dressings: JEANIE  Discharge outcome: After visit summary provided and explained, patient verbalized understanding. Left the floor with all of her belongings and medications.

## 2023-03-14 NOTE — CONSULTS
"3/14/2023      Met with pt via telephone to offer CD services. Pt reports she would not be interested in attending CD treatment and doesn't need any CD resources. Pt reports she's \"good for right now.\" Pt is able to call Mental Health and Addiction Services Line: [phone] and make an appt through [employer] if she would like an evaluation and referrals to CD treatment after she is discharged.     Alise Talbert [facility]  Phone: [phone]  Email: [email or web address]  "

## 2023-03-14 NOTE — PROGRESS NOTES
[id number]: Pt arrived to unit from PACU approximately 1945. A&Ox4, cooperative. VSS. Sutures to R upper gum line intact with minimal scant bloody drainage. Pain controlled 3/10 to mouth.  POC of care for shift presented to patient. Pt states I'm leaving AMA right after my labs. Pt encouraged to wait for MD in AM to check in with patient and to notify nurse if this feeling does not change. Hospitalist ASHLEY paged. Charge and oncoming nurse notified.

## 2023-03-14 NOTE — PLAN OF CARE
Goal Outcome Evaluation:      Plan of Care Reviewed With: patient    Overall Patient Progress: improvingOverall Patient Progress: improving    Shift [id number]:    POD 1 from an extraction of teeth 3, 4, 5, 6, and 7 r/t abscess. A&O x4. Pt has a history of substance abuse - no withdrawal symptoms noticed this shift. Tolerating mechanical soft diet. VSS. Oral incision JEANIE, scant bleeding, sutures intact. Up with SBA. Denies pain. Plan is to continue antibiotics, discharge pending.

## 2023-03-14 NOTE — ANESTHESIA POSTPROCEDURE EVALUATION
Patient: Suzette Tadeo    Procedure: Procedure(s):  Exam under anesthesia, extraction(s) dental, combined; extraction of teeth number 3, 4, 5, 6, and 7  Incision and drainage of upper right dental abcess       Anesthesia Type:  General    Note:  Disposition: Inpatient   Postop Pain Control: Uneventful            Sign Out: Well controlled pain   PONV: No   Neuro/Psych: Uneventful            Sign Out: Acceptable/Baseline neuro status   Airway/Respiratory: Uneventful            Sign Out: Acceptable/Baseline resp. status   CV/Hemodynamics: Uneventful            Sign Out: Acceptable CV status   Other NRE:    DID A NON-ROUTINE EVENT OCCUR? No           Last vitals:  Vitals Value Taken Time   /95 03/13/23 1940   Temp 36.7  C (98  F) 03/13/23 1940   Pulse 82 03/13/23 1940   Resp 16 03/13/23 1940   SpO2 97 % 03/13/23 1940   Vitals shown include unvalidated device data.    Electronically Signed By: Karma Steward  March 13, 2023  8:34 PM

## 2023-03-14 NOTE — PROGRESS NOTES
S: Suzette is a 42-year-old female with substance use disorder s/p extraction of teeth #3-7 on 3/13/2023 secondary to facial cellulitis and dental abscess.    O: No acute overnight events    S: Patient reports significant improvement in facial swelling, eyelid swelling and pain.    O: Extraoral exam: Significantly reduced right-sided facial swelling. No lower lid involvement currently. Intraoral exam: Extraction sites hemostatic. No purulent drainage. Sutures clean, moist and intact. No vestibular fluctuance.     A: Patient progressing well s/p extraction of offending teeth. Stable for discharge to home.    P:  - Ok to discharge to home  - Finish 1 week course of augmentin 875/125 BID  - Peridex BID for 1 week  - Recommend general dental evaluation for treatment planning of remaining carious dentition  - Follow-up with me in 3-5 days  - Dental soft diet and advance diet as tolerated    Alberto Elam DDS, MD

## 2023-03-14 NOTE — PROVIDER NOTIFICATION
Dr Steward updated on BP after 5mg IV labetalol given- last BP: 158/103.  HR:82. Pt with 3/10 pain in mouth.    Per Dr Steward, pt ok to transfer back to gen surg unit.

## 2023-03-14 NOTE — PROGRESS NOTES
RECEIVING UNIT ED HANDOFF REVIEW    ED Nurse Handoff Report was reviewed by: Joi Carey RN on March 14, 2023 at 4:19 PM       I have reviewed the progressive notes regarding the patient.    Joi Carey RN

## 2023-03-14 NOTE — UTILIZATION REVIEW
Admission Status; Secondary Review Determination       Under the authority of the Utilization Management Committee, the utilization review process indicated a secondary review on the above patient. The review outcome is based on review of the medical records, discussions with staff, and applying clinical experience noted on the date of the review.     (x) Inpatient Status Appropriate - This patient's medical care is consistent with medical management for inpatient care and reasonable inpatient medical practice.     RATIONALE FOR DETERMINATION   42 year-old female with history of methamphetamine use disorder, MRSA who presents initially to [facility] with facial/gum swelling and pain, found to have facial cellulitis and gingivobuccal abscess, transferred to [facility] for OMFS evaluation and likely drainage. Admitted on 3/13/2023.     Sepsis secondary to right-sided facial cellulitis with right gingivobuccal sulcus abscess   *Presents with progressive right sided facial swelling and discomfort, gum pain  *CT facial bones with extensive dental disease, right-sided facial cellulitis with 1.5 cm right gingivobuccal sulcus abscess.   *WBC 13.3, , afebrile   Patient requires inpatient admission versus short stay observation or outpatient treatment for the following reasons: High risk patient for outpatient management, her history of substance abuse with meth and phentermine use as well as MRSA complicates her care and risk of adverse outcome.  Also patient was septic with significant systemic inflammatory response in the setting of facial cellulitis and abscess formation requiring multiple teeth extraction.    The expected length of stay at the time of admission was more than 2 nights because of the severity of illness, intensity of service provided, and risk for adverse outcome. Inpatient admission is appropriate.         This document was produced using voice recognition software       The information on  this document is developed by the utilization review team in order for the business office to ensure compliance. This only denotes the appropriateness of proper admission status and does not reflect the quality of care rendered.   The definitions of Inpatient Status and Observation Status used in making the determination above are those provided in the CMS Coverage Manual, Chapter 1 and Chapter 6, section 70.4.   Sincerely,   VICKI GRISSOM MD   System Medical Director   Utilization Management   [facility].

## 2023-03-16 ENCOUNTER — PATIENT OUTREACH (OUTPATIENT)
Dept: CARE COORDINATION | Facility: CLINIC | Age: 43
End: 2023-03-16

## 2023-03-16 NOTE — PROGRESS NOTES
Clinic Care Coordination Contact  [facility]/Voicemail       Clinical Data: Care Coordinator Outreach  Outreach attempted x 2.  Left message on patient's voicemail with call back information and requested return call.  Plan:  Care Coordinator will do no further outreaches at this time.    SIL Gee  [phone]  [facility]

## 2023-03-17 LAB
BACTERIA BLD CULT: NO GROWTH
BACTERIA BLD CULT: NO GROWTH

## (undated) DEVICE — SU CHROMIC 3-0 PS-2 27" 1638H

## (undated) DEVICE — DECANTER VIAL 2006S

## (undated) DEVICE — SU UMBILICAL TAPE 18X.125" U10T

## (undated) DEVICE — SOL NACL 0.9% IRRIG 1000ML BOTTLE 2F7124

## (undated) DEVICE — SPONGE PACK VAGINAL 2X36"

## (undated) DEVICE — ESU GROUND PAD UNIVERSAL W/O CORD

## (undated) DEVICE — BLADE KNIFE SURG 15 371115

## (undated) DEVICE — DRSG STERI STRIP 1/4X3" R1541

## (undated) DEVICE — DRAIN JACKSON PRATT RESERVOIR 100ML SU130-1305

## (undated) DEVICE — DRAPE STERI TOWEL LG 1010

## (undated) DEVICE — GOWN LG DISP 9515

## (undated) DEVICE — SU CHROMIC 4-0 FS-2 27" 635H

## (undated) DEVICE — BUR SIDE CUT 51MM CARBIDE 5091-217

## (undated) DEVICE — ESU ELEC BLADE 2.75" COATED/INSULATED E1455

## (undated) DEVICE — PACK HEAD NECK SEN15HNFSF

## (undated) DEVICE — BUR OVAL LINVATEC 7X70MM CARBIDE 5092-236

## (undated) DEVICE — DRSG GAUZE 4X4" 3033

## (undated) DEVICE — DRSG GAUZE 4X4" 2187

## (undated) DEVICE — LINEN TOWEL PACK X5 5464

## (undated) DEVICE — NDL 27GA 1.25" 305136

## (undated) DEVICE — SOL WATER IRRIG 1000ML BOTTLE 2F7114

## (undated) DEVICE — DRAIN PENROSE 0.25"X18" LATEX FREE GR201

## (undated) DEVICE — PEN MARKING SKIN

## (undated) DEVICE — BNDG SURGILAST SZ 09 LATEX GL-710

## (undated) DEVICE — ESU ELEC NDL 1" E1552

## (undated) DEVICE — BUR DENTAL 1.75X75MM STRAIGHT 560

## (undated) DEVICE — SU UMBILICAL TAPE .125X30" U11T

## (undated) RX ORDER — CHLORHEXIDINE GLUCONATE ORAL RINSE 1.2 MG/ML
SOLUTION DENTAL
Status: DISPENSED
Start: 2023-03-13

## (undated) RX ORDER — ACETAMINOPHEN 500 MG
TABLET ORAL
Status: DISPENSED
Start: 2023-03-13

## (undated) RX ORDER — EPINEPHRINE 1 MG/ML
INJECTION, SOLUTION INTRAMUSCULAR; SUBCUTANEOUS
Status: DISPENSED
Start: 2023-03-13

## (undated) RX ORDER — BUPIVACAINE HYDROCHLORIDE 2.5 MG/ML
INJECTION, SOLUTION EPIDURAL; INFILTRATION; INTRACAUDAL
Status: DISPENSED
Start: 2023-03-13

## (undated) RX ORDER — LIDOCAINE HCL/EPINEPHRINE/PF 2%-1:200K
VIAL (ML) INJECTION
Status: DISPENSED
Start: 2023-03-13

## (undated) RX ORDER — PROPOFOL 10 MG/ML
INJECTION, EMULSION INTRAVENOUS
Status: DISPENSED
Start: 2023-03-13

## (undated) RX ORDER — KETOROLAC TROMETHAMINE 30 MG/ML
INJECTION, SOLUTION INTRAMUSCULAR; INTRAVENOUS
Status: DISPENSED
Start: 2023-03-13

## (undated) RX ORDER — FENTANYL CITRATE 50 UG/ML
INJECTION, SOLUTION INTRAMUSCULAR; INTRAVENOUS
Status: DISPENSED
Start: 2023-03-13

## (undated) RX ORDER — LABETALOL HYDROCHLORIDE 5 MG/ML
INJECTION, SOLUTION INTRAVENOUS
Status: DISPENSED
Start: 2023-03-13